# Patient Record
Sex: FEMALE | Race: WHITE | NOT HISPANIC OR LATINO
[De-identification: names, ages, dates, MRNs, and addresses within clinical notes are randomized per-mention and may not be internally consistent; named-entity substitution may affect disease eponyms.]

---

## 2019-03-05 ENCOUNTER — APPOINTMENT (OUTPATIENT)
Dept: INTERNAL MEDICINE | Facility: CLINIC | Age: 49
End: 2019-03-05
Payer: COMMERCIAL

## 2019-03-05 ENCOUNTER — NON-APPOINTMENT (OUTPATIENT)
Age: 49
End: 2019-03-05

## 2019-03-05 VITALS
BODY MASS INDEX: 25.4 KG/M2 | RESPIRATION RATE: 18 BRPM | SYSTOLIC BLOOD PRESSURE: 122 MMHG | DIASTOLIC BLOOD PRESSURE: 82 MMHG | HEIGHT: 62 IN | HEART RATE: 73 BPM | TEMPERATURE: 98.3 F | OXYGEN SATURATION: 97 % | WEIGHT: 138 LBS

## 2019-03-05 DIAGNOSIS — Z82.49 FAMILY HISTORY OF ISCHEMIC HEART DISEASE AND OTHER DISEASES OF THE CIRCULATORY SYSTEM: ICD-10-CM

## 2019-03-05 DIAGNOSIS — Z80.9 FAMILY HISTORY OF MALIGNANT NEOPLASM, UNSPECIFIED: ICD-10-CM

## 2019-03-05 DIAGNOSIS — Z78.9 OTHER SPECIFIED HEALTH STATUS: ICD-10-CM

## 2019-03-05 DIAGNOSIS — Z84.2 FAMILY HISTORY OF OTHER DISEASES OF THE GENITOURINARY SYSTEM: ICD-10-CM

## 2019-03-05 DIAGNOSIS — Z83.79 FAMILY HISTORY OF OTHER DISEASES OF THE DIGESTIVE SYSTEM: ICD-10-CM

## 2019-03-05 DIAGNOSIS — Z82.5 FAMILY HISTORY OF ASTHMA AND OTHER CHRONIC LOWER RESPIRATORY DISEASES: ICD-10-CM

## 2019-03-05 DIAGNOSIS — Z82.69 FAMILY HISTORY OF OTHER DISEASES OF THE MUSCULOSKELETAL SYSTEM AND CONNECTIVE TISSUE: ICD-10-CM

## 2019-03-05 DIAGNOSIS — Z83.2 FAMILY HISTORY OF DISEASES OF THE BLOOD AND BLOOD-FORMING ORGANS AND CERTAIN DISORDERS INVOLVING THE IMMUNE MECHANISM: ICD-10-CM

## 2019-03-05 PROCEDURE — 99205 OFFICE O/P NEW HI 60 MIN: CPT | Mod: 25

## 2019-03-05 PROCEDURE — 93000 ELECTROCARDIOGRAM COMPLETE: CPT

## 2019-03-05 PROCEDURE — ZZZZZ: CPT

## 2019-03-05 PROCEDURE — 94729 DIFFUSING CAPACITY: CPT

## 2019-03-05 PROCEDURE — 94060 EVALUATION OF WHEEZING: CPT

## 2019-03-05 PROCEDURE — 94727 GAS DIL/WSHOT DETER LNG VOL: CPT

## 2019-03-05 NOTE — PLAN
[FreeTextEntry1] : Ms. Nash presents for initial evaluation for primary care. She has a history of mild asthma and will continue on Singulair 10 mg daily and albuterol p.r.n. She is complaining of episodes of heartburn and chest discomfort at night. Patient will be started on Protonix 40 mg daily. She will followup in 6 weeks' time. Prescription for comprehensive blood profile has been given. Patient has also been referred to cardiology for her evaluation of palpitations. She will most likely require an echocardiogram. I've advised her to minimize caffeine intake. Followup is scheduled

## 2019-03-05 NOTE — DATA REVIEWED
[FreeTextEntry1] : Complete pulmonary function tests were normal spirometry, lung volumes and flow volume loop. Diffusing capacity is normal.

## 2019-03-05 NOTE — HEALTH RISK ASSESSMENT
[Excellent] : ~his/her~  mood as  excellent [16-20] : 16-20 [No falls in past year] : Patient reported no falls in the past year [0] : 2) Feeling down, depressed, or hopeless: Not at all (0) [Fully functional (bathing, dressing, toileting, transferring, walking, feeding)] : Fully functional (bathing, dressing, toileting, transferring, walking, feeding) [Fully functional (using the telephone, shopping, preparing meals, housekeeping, doing laundry, using] : Fully functional and needs no help or supervision to perform IADLs (using the telephone, shopping, preparing meals, housekeeping, doing laundry, using transportation, managing medications and managing finances) [Aggressive treatment] : aggressive treatment [] : No [Change in mental status noted] : No change in mental status noted [AdvancecareDate] : 3/19

## 2019-03-05 NOTE — HISTORY OF PRESENT ILLNESS
[FreeTextEntry1] : initial physical examination [de-identified] : Ms. Nash presents for initial physical examination. She has a history of mild asthma. She is currently controlled on Singulair 10 mg daily and has albuterol metered-dose inhaler which he uses on a p.r.n. basis. The patient fell several months without requiring albuterol. Ms. Nash is complaining of some symptoms of fatigue mostly at night. This is associated with some palpitations. Patient states she K. she wakes up in the morning with a headache. There is no history of snoring or witnessed apneas. Her  does have obstructive sleep apnea and wears a CPAP device. Ms. Nash also is complaining of intermittent episodes of chest discomfort at night. She does have symptoms of heartburn during the day and evening time and takes TUMS on an intermittent basis. The TUMS to provide relief. She denies any shortness of breath. Ms. Nash is a nonsmoker. She stopped smoking in 2009 and has a 20-pack-year history. She works as a .

## 2019-03-06 ENCOUNTER — TRANSCRIPTION ENCOUNTER (OUTPATIENT)
Age: 49
End: 2019-03-06

## 2019-04-23 ENCOUNTER — APPOINTMENT (OUTPATIENT)
Dept: INTERNAL MEDICINE | Facility: CLINIC | Age: 49
End: 2019-04-23

## 2019-05-03 ENCOUNTER — APPOINTMENT (OUTPATIENT)
Dept: CARDIOLOGY | Facility: CLINIC | Age: 49
End: 2019-05-03
Payer: COMMERCIAL

## 2019-05-03 ENCOUNTER — NON-APPOINTMENT (OUTPATIENT)
Age: 49
End: 2019-05-03

## 2019-05-03 VITALS
OXYGEN SATURATION: 99 % | HEART RATE: 72 BPM | SYSTOLIC BLOOD PRESSURE: 96 MMHG | WEIGHT: 144 LBS | BODY MASS INDEX: 26.34 KG/M2 | DIASTOLIC BLOOD PRESSURE: 62 MMHG

## 2019-05-03 PROCEDURE — 99204 OFFICE O/P NEW MOD 45 MIN: CPT | Mod: 25

## 2019-05-03 PROCEDURE — 93000 ELECTROCARDIOGRAM COMPLETE: CPT

## 2019-05-03 RX ORDER — MONTELUKAST 10 MG/1
10 TABLET, FILM COATED ORAL
Refills: 0 | Status: DISCONTINUED | COMMUNITY
End: 2019-05-03

## 2019-05-03 NOTE — PHYSICAL EXAM
[General Appearance - Well Developed] : well developed [Well Groomed] : well groomed [Normal Appearance] : normal appearance [No Deformities] : no deformities [General Appearance - Well Nourished] : well nourished [Normal Conjunctiva] : the conjunctiva exhibited no abnormalities [General Appearance - In No Acute Distress] : no acute distress [Normal Oral Mucosa] : normal oral mucosa [Eyelids - No Xanthelasma] : the eyelids demonstrated no xanthelasmas [Normal Jugular Venous A Waves Present] : normal jugular venous A waves present [No Oral Cyanosis] : no oral cyanosis [No Oral Pallor] : no oral pallor [No Jugular Venous Burton A Waves] : no jugular venous burton A waves [Normal Jugular Venous V Waves Present] : normal jugular venous V waves present [Heart Rate And Rhythm] : heart rate and rhythm were normal [Heart Sounds] : normal S1 and S2 [Murmurs] : no murmurs present [Respiration, Rhythm And Depth] : normal respiratory rhythm and effort [Exaggerated Use Of Accessory Muscles For Inspiration] : no accessory muscle use [Abdomen Soft] : soft [Auscultation Breath Sounds / Voice Sounds] : lungs were clear to auscultation bilaterally [Abdomen Tenderness] : non-tender [Abdomen Mass (___ Cm)] : no abdominal mass palpated [Abnormal Walk] : normal gait [Gait - Sufficient For Exercise Testing] : the gait was sufficient for exercise testing [Cyanosis, Localized] : no localized cyanosis [Nail Clubbing] : no clubbing of the fingernails [Petechial Hemorrhages (___cm)] : no petechial hemorrhages [] : no rash [Skin Color & Pigmentation] : normal skin color and pigmentation [No Venous Stasis] : no venous stasis [No Skin Ulcers] : no skin ulcer [Skin Lesions] : no skin lesions [No Xanthoma] : no  xanthoma was observed [Oriented To Time, Place, And Person] : oriented to person, place, and time [Affect] : the affect was normal [Mood] : the mood was normal [No Anxiety] : not feeling anxious

## 2019-05-03 NOTE — HISTORY OF PRESENT ILLNESS
[FreeTextEntry1] : 50 yo female presents for cardiac evaluation. Pt reports chest discomfort at rest and with exertion. Pt was recently treated with PPI for symptoms of Gerd with a good result. Pt reports that chest discomfort is pressure like and is relieved by folding her hand over her chest. Pain is not affected by breathing or palpation.

## 2019-05-03 NOTE — DISCUSSION/SUMMARY
[Possible Cardiac Ischemia (Intermd Prob)] : possible cardiac ischemia (intermediate probability) [Paroxysmal SVT] : paroxysmal supraventricular tachycardia [Patient] : the patient [FreeTextEntry1] : Pt will continue to suspend singulair therapy. She will take a multivitamin, and Mg supplementation. Echo and ETT. F/U in 1 mo

## 2019-05-20 ENCOUNTER — APPOINTMENT (OUTPATIENT)
Dept: CARDIOLOGY | Facility: CLINIC | Age: 49
End: 2019-05-20
Payer: COMMERCIAL

## 2019-05-20 PROCEDURE — 93306 TTE W/DOPPLER COMPLETE: CPT

## 2019-05-24 ENCOUNTER — APPOINTMENT (OUTPATIENT)
Dept: CARDIOLOGY | Facility: CLINIC | Age: 49
End: 2019-05-24
Payer: COMMERCIAL

## 2019-05-24 PROCEDURE — 93015 CV STRESS TEST SUPVJ I&R: CPT

## 2019-05-31 ENCOUNTER — APPOINTMENT (OUTPATIENT)
Dept: CARDIOLOGY | Facility: CLINIC | Age: 49
End: 2019-05-31
Payer: COMMERCIAL

## 2019-05-31 ENCOUNTER — NON-APPOINTMENT (OUTPATIENT)
Age: 49
End: 2019-05-31

## 2019-05-31 VITALS
WEIGHT: 141 LBS | HEIGHT: 62 IN | HEART RATE: 84 BPM | OXYGEN SATURATION: 100 % | BODY MASS INDEX: 25.95 KG/M2 | SYSTOLIC BLOOD PRESSURE: 107 MMHG | DIASTOLIC BLOOD PRESSURE: 69 MMHG

## 2019-05-31 PROCEDURE — 93000 ELECTROCARDIOGRAM COMPLETE: CPT

## 2019-05-31 PROCEDURE — 99214 OFFICE O/P EST MOD 30 MIN: CPT | Mod: 25

## 2019-06-01 NOTE — DISCUSSION/SUMMARY
[Possible Cardiac Ischemia (Intermd Prob)] : possible cardiac ischemia (intermediate probability) [Paroxysmal SVT] : paroxysmal supraventricular tachycardia [Patient] : the patient [FreeTextEntry1] : Pt will continue to exercise and eat a heart healthy diet. She will avoid medications which may exacerbate palpitations.

## 2019-06-01 NOTE — HISTORY OF PRESENT ILLNESS
[FreeTextEntry1] : 48 yo female presents for follow up for testing results. Her previously described palpitations have been remediated by cessation of Singulair and her chest discomfort has been improved with PPI therapy. Her ETT and Echo were performed and were unremarkable.

## 2019-06-01 NOTE — PHYSICAL EXAM
[General Appearance - Well Developed] : well developed [Well Groomed] : well groomed [Normal Appearance] : normal appearance [No Deformities] : no deformities [General Appearance - Well Nourished] : well nourished [General Appearance - In No Acute Distress] : no acute distress [Normal Conjunctiva] : the conjunctiva exhibited no abnormalities [Eyelids - No Xanthelasma] : the eyelids demonstrated no xanthelasmas [No Oral Pallor] : no oral pallor [Normal Oral Mucosa] : normal oral mucosa [Normal Jugular Venous A Waves Present] : normal jugular venous A waves present [No Oral Cyanosis] : no oral cyanosis [Normal Jugular Venous V Waves Present] : normal jugular venous V waves present [No Jugular Venous Burton A Waves] : no jugular venous burton A waves [Respiration, Rhythm And Depth] : normal respiratory rhythm and effort [Exaggerated Use Of Accessory Muscles For Inspiration] : no accessory muscle use [Auscultation Breath Sounds / Voice Sounds] : lungs were clear to auscultation bilaterally [Heart Sounds] : normal S1 and S2 [Heart Rate And Rhythm] : heart rate and rhythm were normal [Abdomen Soft] : soft [Murmurs] : no murmurs present [Abdomen Tenderness] : non-tender [Abdomen Mass (___ Cm)] : no abdominal mass palpated [Abnormal Walk] : normal gait [Gait - Sufficient For Exercise Testing] : the gait was sufficient for exercise testing [Cyanosis, Localized] : no localized cyanosis [Petechial Hemorrhages (___cm)] : no petechial hemorrhages [Nail Clubbing] : no clubbing of the fingernails [Skin Color & Pigmentation] : normal skin color and pigmentation [] : no rash [No Venous Stasis] : no venous stasis [Skin Lesions] : no skin lesions [No Xanthoma] : no  xanthoma was observed [No Skin Ulcers] : no skin ulcer [Oriented To Time, Place, And Person] : oriented to person, place, and time [Affect] : the affect was normal [No Anxiety] : not feeling anxious [Mood] : the mood was normal

## 2019-08-26 ENCOUNTER — RX RENEWAL (OUTPATIENT)
Age: 49
End: 2019-08-26

## 2020-01-08 ENCOUNTER — APPOINTMENT (OUTPATIENT)
Dept: INTERNAL MEDICINE | Facility: CLINIC | Age: 50
End: 2020-01-08
Payer: COMMERCIAL

## 2020-01-08 ENCOUNTER — NON-APPOINTMENT (OUTPATIENT)
Age: 50
End: 2020-01-08

## 2020-01-08 VITALS
WEIGHT: 141 LBS | RESPIRATION RATE: 20 BRPM | HEART RATE: 100 BPM | DIASTOLIC BLOOD PRESSURE: 88 MMHG | BODY MASS INDEX: 25.95 KG/M2 | OXYGEN SATURATION: 98 % | TEMPERATURE: 99.1 F | HEIGHT: 62 IN | SYSTOLIC BLOOD PRESSURE: 142 MMHG

## 2020-01-08 PROCEDURE — 99214 OFFICE O/P EST MOD 30 MIN: CPT | Mod: 25

## 2020-01-08 PROCEDURE — 94060 EVALUATION OF WHEEZING: CPT

## 2020-01-08 NOTE — PHYSICAL EXAM
[No Acute Distress] : no acute distress [Well Nourished] : well nourished [Well Developed] : well developed [Well-Appearing] : well-appearing [Normal Sclera/Conjunctiva] : normal sclera/conjunctiva [PERRL] : pupils equal round and reactive to light [Normal Outer Ear/Nose] : the outer ears and nose were normal in appearance [EOMI] : extraocular movements intact [No Lymphadenopathy] : no lymphadenopathy [No JVD] : no jugular venous distention [Normal Oropharynx] : the oropharynx was normal [Supple] : supple [Thyroid Normal, No Nodules] : the thyroid was normal and there were no nodules present [No Accessory Muscle Use] : no accessory muscle use [Clear to Auscultation] : lungs were clear to auscultation bilaterally [No Respiratory Distress] : no respiratory distress  [Normal Rate] : normal rate  [Normal S1, S2] : normal S1 and S2 [Regular Rhythm] : with a regular rhythm [No Murmur] : no murmur heard [No Carotid Bruits] : no carotid bruits [No Varicosities] : no varicosities [No Abdominal Bruit] : a ~M bruit was not heard ~T in the abdomen [No Edema] : there was no peripheral edema [Pedal Pulses Present] : the pedal pulses are present [No Extremity Clubbing/Cyanosis] : no extremity clubbing/cyanosis [Soft] : abdomen soft [No Palpable Aorta] : no palpable aorta [Non Tender] : non-tender [Non-distended] : non-distended [No HSM] : no HSM [No Masses] : no abdominal mass palpated [Normal Bowel Sounds] : normal bowel sounds [No CVA Tenderness] : no CVA  tenderness [Normal Posterior Cervical Nodes] : no posterior cervical lymphadenopathy [Normal Anterior Cervical Nodes] : no anterior cervical lymphadenopathy [No Spinal Tenderness] : no spinal tenderness [No Joint Swelling] : no joint swelling [Grossly Normal Strength/Tone] : grossly normal strength/tone [No Rash] : no rash [Coordination Grossly Intact] : coordination grossly intact [No Focal Deficits] : no focal deficits [Normal Gait] : normal gait [Deep Tendon Reflexes (DTR)] : deep tendon reflexes were 2+ and symmetric [Normal Affect] : the affect was normal [Normal Insight/Judgement] : insight and judgment were intact

## 2020-01-09 ENCOUNTER — OUTPATIENT (OUTPATIENT)
Dept: OUTPATIENT SERVICES | Facility: HOSPITAL | Age: 50
LOS: 1 days | End: 2020-01-09
Payer: COMMERCIAL

## 2020-01-09 DIAGNOSIS — R09.81 NASAL CONGESTION: ICD-10-CM

## 2020-01-09 PROCEDURE — 71046 X-RAY EXAM CHEST 2 VIEWS: CPT | Mod: 26

## 2020-01-10 ENCOUNTER — CHART COPY (OUTPATIENT)
Age: 50
End: 2020-01-10

## 2020-01-10 NOTE — HISTORY OF PRESENT ILLNESS
[FreeTextEntry1] : acute evaluation [de-identified] : Ms. Nash presents for follow up evaluation. She is complaining of sinus congestion and persistent cough for the past 6 weeks. The cough keeps her up at night. It is occasionally productive of clear phlegm. She did have a course of Augmentin with no significant improvement. Ms. Nash is complaining of sinus congestion and postnasal drip.

## 2020-01-10 NOTE — PLAN
[FreeTextEntry1] : Spirometry pre-and post bronchodilator therapy shows normal flow rates  presents for acute evaluation. She will be sent for a chest x-ray, PA and lateral due to her persistent cough. Patient given prednisone 20 mg p.o. b.i.d. x7 days. She does not require oral antibiotics. Patient has been given a prescription comprehensive blood profile. She will followup in 3 months for complete physical examination.

## 2020-04-08 ENCOUNTER — APPOINTMENT (OUTPATIENT)
Dept: INTERNAL MEDICINE | Facility: CLINIC | Age: 50
End: 2020-04-08

## 2020-06-08 ENCOUNTER — APPOINTMENT (OUTPATIENT)
Dept: INTERNAL MEDICINE | Facility: CLINIC | Age: 50
End: 2020-06-08

## 2020-09-24 ENCOUNTER — APPOINTMENT (OUTPATIENT)
Dept: INTERNAL MEDICINE | Facility: CLINIC | Age: 50
End: 2020-09-24
Payer: COMMERCIAL

## 2020-09-24 VITALS
RESPIRATION RATE: 16 BRPM | HEIGHT: 62 IN | TEMPERATURE: 98.2 F | OXYGEN SATURATION: 98 % | DIASTOLIC BLOOD PRESSURE: 76 MMHG | HEART RATE: 72 BPM | WEIGHT: 140 LBS | BODY MASS INDEX: 25.76 KG/M2 | SYSTOLIC BLOOD PRESSURE: 124 MMHG

## 2020-09-24 DIAGNOSIS — Z00.00 ENCOUNTER FOR GENERAL ADULT MEDICAL EXAMINATION W/OUT ABNORMAL FINDINGS: ICD-10-CM

## 2020-09-24 PROCEDURE — 99213 OFFICE O/P EST LOW 20 MIN: CPT

## 2020-09-24 RX ORDER — HYDROCODONE BITARTRATE AND HOMATROPINE METHYLBROMIDE 5; 1.5 MG/5ML; MG/5ML
5-1.5 SYRUP ORAL
Qty: 240 | Refills: 0 | Status: DISCONTINUED | COMMUNITY
Start: 2020-01-08 | End: 2020-09-24

## 2020-09-24 RX ORDER — UBIDECARENONE 200 MG
CAPSULE ORAL
Refills: 0 | Status: DISCONTINUED | COMMUNITY
End: 2020-09-24

## 2020-09-24 RX ORDER — BROMPHENIRAMINE MALEATE, PSEUDOEPHEDRINE HYDROCHLORIDE, 2; 30; 10 MG/5ML; MG/5ML; MG/5ML
30-2-10 SYRUP ORAL
Qty: 200 | Refills: 0 | Status: DISCONTINUED | COMMUNITY
Start: 2019-08-15 | End: 2020-09-24

## 2020-09-24 RX ORDER — ALBUTEROL SULFATE 90 UG/1
108 (90 BASE) INHALANT RESPIRATORY (INHALATION)
Qty: 18 | Refills: 0 | Status: DISCONTINUED | COMMUNITY
Start: 2019-08-17 | End: 2020-09-24

## 2020-09-24 RX ORDER — AMOXICILLIN 875 MG/1
875 TABLET, FILM COATED ORAL
Qty: 20 | Refills: 0 | Status: DISCONTINUED | COMMUNITY
Start: 2019-08-15 | End: 2020-09-24

## 2020-09-24 RX ORDER — PNV NO.95/FERROUS FUM/FOLIC AC 28MG-0.8MG
TABLET ORAL
Refills: 0 | Status: DISCONTINUED | COMMUNITY
End: 2020-09-24

## 2020-09-24 RX ORDER — IPRATROPIUM BROMIDE 42 UG/1
0.06 SPRAY NASAL
Qty: 15 | Refills: 0 | Status: DISCONTINUED | COMMUNITY
Start: 2019-08-17 | End: 2020-09-24

## 2020-09-24 RX ORDER — PREDNISONE 10 MG/1
10 TABLET ORAL
Qty: 24 | Refills: 0 | Status: DISCONTINUED | COMMUNITY
Start: 2019-08-17 | End: 2020-09-24

## 2020-09-24 RX ORDER — PREDNISONE 20 MG/1
20 TABLET ORAL TWICE DAILY
Qty: 14 | Refills: 1 | Status: DISCONTINUED | COMMUNITY
Start: 2020-01-08 | End: 2020-09-24

## 2020-09-24 RX ORDER — PANTOPRAZOLE 40 MG/1
40 TABLET, DELAYED RELEASE ORAL
Qty: 30 | Refills: 3 | Status: DISCONTINUED | COMMUNITY
Start: 2019-03-05 | End: 2020-09-24

## 2020-09-24 RX ORDER — NORETHINDRONE ACETATE AND ETHINYL ESTRADIOL AND FERROUS FUMARATE 1MG-20(21)
1-20 KIT ORAL
Qty: 28 | Refills: 0 | Status: DISCONTINUED | COMMUNITY
Start: 2019-02-25 | End: 2020-09-24

## 2020-09-24 RX ORDER — MOMETASONE 50 UG/1
50 SPRAY, METERED NASAL
Qty: 1 | Refills: 5 | Status: DISCONTINUED | COMMUNITY
Start: 2020-01-08 | End: 2020-09-24

## 2020-09-24 NOTE — ASSESSMENT
[FreeTextEntry1] : GERD triggers reviewed. Avoid caffeine, alcohol, peppermint, chocolate, acidic and spicy foods, late night eating.\par Take PPI 30 minutes before breakfast.  \par Call if symptoms not improving 2 weeks.\par \par Schedule CPE.  Do FBW prior to visit.

## 2020-09-24 NOTE — HISTORY OF PRESENT ILLNESS
[FreeTextEntry8] : sore throat\par \par Presents today with throat pain for several weeks.  Throat feels very dry.  She has some pain with swallowing. There are no fevers or chills.  She does have some seasonal allergies but no significant PND. nasal congestion or cough.   No GERD symptoms. At times feels lump sensation. Last year she was placed on Pantoprazole for silent GERD and states her asthma symptoms went away.   Stopped meds months ago.  \par Smokes rare cigarette. \par She did have COVID in March and was able to treat herself at home with symptomatic treatment.  Had AB testing done and was +

## 2020-10-13 ENCOUNTER — APPOINTMENT (OUTPATIENT)
Dept: INTERNAL MEDICINE | Facility: CLINIC | Age: 50
End: 2020-10-13
Payer: COMMERCIAL

## 2020-10-13 VITALS
HEART RATE: 75 BPM | TEMPERATURE: 98 F | DIASTOLIC BLOOD PRESSURE: 84 MMHG | WEIGHT: 147 LBS | SYSTOLIC BLOOD PRESSURE: 140 MMHG | OXYGEN SATURATION: 97 % | RESPIRATION RATE: 18 BRPM | HEIGHT: 62 IN | BODY MASS INDEX: 27.05 KG/M2

## 2020-10-13 DIAGNOSIS — Z87.898 PERSONAL HISTORY OF OTHER SPECIFIED CONDITIONS: ICD-10-CM

## 2020-10-13 DIAGNOSIS — Z86.19 PERSONAL HISTORY OF OTHER INFECTIOUS AND PARASITIC DISEASES: ICD-10-CM

## 2020-10-13 PROCEDURE — 99396 PREV VISIT EST AGE 40-64: CPT

## 2020-10-13 NOTE — HEALTH RISK ASSESSMENT
[Yes] : Yes [Employed] : employed [Smoke Detector] : smoke detector [Carbon Monoxide Detector] : carbon monoxide detector [Guns at Home] : guns at home [Safety elements used in home] : safety elements used in home [Seat Belt] :  uses seat belt [Sunscreen] : uses sunscreen [Very Good] : ~his/her~ current health as very good [No] : In the past 12 months have you used drugs other than those required for medical reasons? 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

## 2020-10-13 NOTE — HISTORY OF PRESENT ILLNESS
[FreeTextEntry1] : followup [de-identified] : Ms. Nash presents for an annual physical examination. She is currently feeling well. The patient was diagnosed with the corona virus infection in March. At that time she had bodyaches and fever. Currently, she has no chest pain, shortness of breath or palpitations. The patient denies any hematuria or dysuria. She does have a some symptoms of gastroesophageal reflux disease. She was seen by nurse practitioner Gayatri Robertson several weeks ago and started on pantoprazole. her throat pain and feeling of lump in her throat have resolved.

## 2020-10-13 NOTE — PLAN
[FreeTextEntry1] : Ms. Nash presents for an annual physical examination. She has a history of corona virus infection. She will continue on pantoprazole for symptoms of gastroesophageal reflux disease. She will discontinue medication after 6-8 weeks. She has return of symptoms she will follow up with gastroenterology for possible endoscopy. She has been referred for screening colonoscopy. Patient will go for comprehensive blood profile. Followup in one year.

## 2020-12-20 ENCOUNTER — RX RENEWAL (OUTPATIENT)
Age: 50
End: 2020-12-20

## 2021-01-11 LAB — HBA1C MFR BLD HPLC: 5.2

## 2021-02-08 ENCOUNTER — APPOINTMENT (OUTPATIENT)
Dept: INTERNAL MEDICINE | Facility: CLINIC | Age: 51
End: 2021-02-08
Payer: COMMERCIAL

## 2021-02-08 DIAGNOSIS — R79.89 OTHER SPECIFIED ABNORMAL FINDINGS OF BLOOD CHEMISTRY: ICD-10-CM

## 2021-02-08 PROCEDURE — 99442: CPT

## 2021-02-08 RX ORDER — PANTOPRAZOLE 40 MG/1
40 TABLET, DELAYED RELEASE ORAL DAILY
Qty: 30 | Refills: 1 | Status: DISCONTINUED | COMMUNITY
Start: 2020-09-24 | End: 2021-02-08

## 2021-02-08 NOTE — HISTORY OF PRESENT ILLNESS
[Other Location: e.g. School (Enter Location, City,State)___] : at [unfilled], at the time of the visit. [Medical Office: ([facility])___] : at the medical office located in  [Verbal consent obtained from patient] : the patient, [unfilled] [FreeTextEntry8] : UTI/review recent BW\par Having frequency and dysuria for several days without fevers. Some lower abd pressure.  Treated with NItrofuantoin x 5 days 3 weeks ago for UTI by GYN with improvement in symptoms at that time.  Recent UA + WBCs, 3+luek esterace.  Culture is pending.  She has initial appt with Dr. Graham 2/12.  Will tx empirically with Cipro 500 mg bid x 7 days. Await UC\par Additionally LFTS were repeated due to elevations of AST/ALT.  These remain elevated.  Hep screen neg.  Will obtain abdominal sonogram and advised to see GI /Ashley.    Colonoscopy also due.  .

## 2021-02-11 ENCOUNTER — NON-APPOINTMENT (OUTPATIENT)
Age: 51
End: 2021-02-11

## 2021-02-13 ENCOUNTER — APPOINTMENT (OUTPATIENT)
Dept: ULTRASOUND IMAGING | Facility: CLINIC | Age: 51
End: 2021-02-13
Payer: COMMERCIAL

## 2021-02-13 ENCOUNTER — OUTPATIENT (OUTPATIENT)
Dept: OUTPATIENT SERVICES | Facility: HOSPITAL | Age: 51
LOS: 1 days | End: 2021-02-13
Payer: COMMERCIAL

## 2021-02-13 DIAGNOSIS — R79.89 OTHER SPECIFIED ABNORMAL FINDINGS OF BLOOD CHEMISTRY: ICD-10-CM

## 2021-02-13 PROCEDURE — 76700 US EXAM ABDOM COMPLETE: CPT

## 2021-02-13 PROCEDURE — 76700 US EXAM ABDOM COMPLETE: CPT | Mod: 26

## 2021-02-17 ENCOUNTER — NON-APPOINTMENT (OUTPATIENT)
Age: 51
End: 2021-02-17

## 2021-02-18 ENCOUNTER — NON-APPOINTMENT (OUTPATIENT)
Age: 51
End: 2021-02-18

## 2021-03-23 ENCOUNTER — APPOINTMENT (OUTPATIENT)
Dept: GASTROENTEROLOGY | Facility: CLINIC | Age: 51
End: 2021-03-23
Payer: COMMERCIAL

## 2021-03-23 VITALS
BODY MASS INDEX: 27.42 KG/M2 | HEART RATE: 89 BPM | HEIGHT: 62 IN | WEIGHT: 149 LBS | SYSTOLIC BLOOD PRESSURE: 150 MMHG | DIASTOLIC BLOOD PRESSURE: 78 MMHG

## 2021-03-23 DIAGNOSIS — Z12.11 ENCOUNTER FOR SCREENING FOR MALIGNANT NEOPLASM OF COLON: ICD-10-CM

## 2021-03-23 PROCEDURE — 99072 ADDL SUPL MATRL&STAF TM PHE: CPT

## 2021-03-23 PROCEDURE — 99203 OFFICE O/P NEW LOW 30 MIN: CPT

## 2021-03-23 NOTE — HISTORY OF PRESENT ILLNESS
[de-identified] : Ms. MARIYA VALDIVIA is a 51 year old female with history of GERD symptoms requiring pantoprazole. Patient has had asthma which has responded to the use of PPIs eliminating were as medication. Patient does note occasional dysphagia which responds to drinking liquids. There has been no weight loss. There has been no bowel changes. Patient never had prior screening.\par

## 2021-03-23 NOTE — ASSESSMENT
[FreeTextEntry1] : 50 yo female screening colonoscopy with history of GERD symptoms and history of dysphagia. Will arrange for colonoscopy with Suprep and upper endoscopy.

## 2021-04-24 ENCOUNTER — APPOINTMENT (OUTPATIENT)
Dept: DISASTER EMERGENCY | Facility: CLINIC | Age: 51
End: 2021-04-24

## 2021-04-25 LAB — SARS-COV-2 N GENE NPH QL NAA+PROBE: NOT DETECTED

## 2021-04-27 ENCOUNTER — RESULT REVIEW (OUTPATIENT)
Age: 51
End: 2021-04-27

## 2021-04-27 ENCOUNTER — APPOINTMENT (OUTPATIENT)
Dept: GASTROENTEROLOGY | Facility: AMBULATORY MEDICAL SERVICES | Age: 51
End: 2021-04-27
Payer: COMMERCIAL

## 2021-04-27 PROCEDURE — 45378 DIAGNOSTIC COLONOSCOPY: CPT

## 2021-04-27 PROCEDURE — 43239 EGD BIOPSY SINGLE/MULTIPLE: CPT

## 2021-06-30 ENCOUNTER — OUTPATIENT (OUTPATIENT)
Dept: OUTPATIENT SERVICES | Facility: HOSPITAL | Age: 51
LOS: 1 days | End: 2021-06-30
Payer: COMMERCIAL

## 2021-06-30 ENCOUNTER — APPOINTMENT (OUTPATIENT)
Dept: INTERNAL MEDICINE | Facility: CLINIC | Age: 51
End: 2021-06-30
Payer: COMMERCIAL

## 2021-06-30 ENCOUNTER — APPOINTMENT (OUTPATIENT)
Dept: RADIOLOGY | Facility: CLINIC | Age: 51
End: 2021-06-30
Payer: COMMERCIAL

## 2021-06-30 VITALS
WEIGHT: 142 LBS | TEMPERATURE: 98.3 F | OXYGEN SATURATION: 97 % | DIASTOLIC BLOOD PRESSURE: 82 MMHG | RESPIRATION RATE: 16 BRPM | SYSTOLIC BLOOD PRESSURE: 130 MMHG | HEART RATE: 86 BPM | BODY MASS INDEX: 26.13 KG/M2 | HEIGHT: 62 IN

## 2021-06-30 DIAGNOSIS — R05 COUGH: ICD-10-CM

## 2021-06-30 PROCEDURE — 71046 X-RAY EXAM CHEST 2 VIEWS: CPT | Mod: 26

## 2021-06-30 PROCEDURE — 71046 X-RAY EXAM CHEST 2 VIEWS: CPT

## 2021-06-30 PROCEDURE — 99214 OFFICE O/P EST MOD 30 MIN: CPT | Mod: 25

## 2021-06-30 RX ORDER — SODIUM SULFATE, POTASSIUM SULFATE, MAGNESIUM SULFATE 17.5; 3.13; 1.6 G/ML; G/ML; G/ML
17.5-3.13-1.6 SOLUTION, CONCENTRATE ORAL
Qty: 1 | Refills: 0 | Status: DISCONTINUED | COMMUNITY
Start: 2021-03-23 | End: 2021-06-30

## 2021-06-30 RX ORDER — CIPROFLOXACIN HYDROCHLORIDE 500 MG/1
500 TABLET, FILM COATED ORAL TWICE DAILY
Qty: 14 | Refills: 0 | Status: DISCONTINUED | COMMUNITY
Start: 2021-02-08 | End: 2021-06-30

## 2021-06-30 NOTE — PLAN
[FreeTextEntry1] : \par \par Do CXR today\par Check LFTs,bmp \par Start Medrol dose pack with food\par \par All questions were answered.  \par Reviewed symptoms to merit emergent  ED evaluation.\par Call with any concerns.\par \par Case and plan reviewed with Dr. Jackson.  \par

## 2021-06-30 NOTE — PHYSICAL EXAM
[Normal] : normal rate, regular rhythm, normal S1 and S2 and no murmur heard [No Edema] : there was no peripheral edema [No Extremity Clubbing/Cyanosis] : no extremity clubbing/cyanosis [Soft] : abdomen soft [Non Tender] : non-tender [Normal Bowel Sounds] : normal bowel sounds [de-identified] : TTP R lower chest wall

## 2021-06-30 NOTE — HISTORY OF PRESENT ILLNESS
[FreeTextEntry8] : cough\par Has a mild cough for the past few weeks.  Cough slightly productive of clear sputum.  At times has  tight sensation R lower chest with deep inspiration.    NO fevers, wheeze.   No hemoptysis.  Similar pain when she had pneumonia many years ago.    Went to  6/23.   Given zpack and cough med which she stopped after 4 days as she was receiving her first COVID vaccine 6/27/21.  Able to exercise for 20 minutes on ellipical. \par H/O elevated LFTs with normal abdominal sono.  Hep screen, mono neg.  Saw GI. Had colon/egd.

## 2021-07-01 ENCOUNTER — NON-APPOINTMENT (OUTPATIENT)
Age: 51
End: 2021-07-01

## 2021-07-01 ENCOUNTER — OUTPATIENT (OUTPATIENT)
Dept: OUTPATIENT SERVICES | Facility: HOSPITAL | Age: 51
LOS: 1 days | End: 2021-07-01
Payer: COMMERCIAL

## 2021-07-01 ENCOUNTER — APPOINTMENT (OUTPATIENT)
Dept: CT IMAGING | Facility: CLINIC | Age: 51
End: 2021-07-01
Payer: COMMERCIAL

## 2021-07-01 DIAGNOSIS — R07.9 CHEST PAIN, UNSPECIFIED: ICD-10-CM

## 2021-07-01 LAB
ALBUMIN SERPL ELPH-MCNC: 4.7 G/DL
ALP BLD-CCNC: 49 U/L
ALT SERPL-CCNC: 41 U/L
ANION GAP SERPL CALC-SCNC: 16 MMOL/L
AST SERPL-CCNC: 39 U/L
BILIRUB DIRECT SERPL-MCNC: 0.1 MG/DL
BILIRUB INDIRECT SERPL-MCNC: 0.1 MG/DL
BILIRUB SERPL-MCNC: 0.2 MG/DL
BUN SERPL-MCNC: 13 MG/DL
CALCIUM SERPL-MCNC: 10.4 MG/DL
CHLORIDE SERPL-SCNC: 101 MMOL/L
CO2 SERPL-SCNC: 23 MMOL/L
CREAT SERPL-MCNC: 0.82 MG/DL
GLUCOSE SERPL-MCNC: 91 MG/DL
POTASSIUM SERPL-SCNC: 4.3 MMOL/L
PROT SERPL-MCNC: 7.5 G/DL
SODIUM SERPL-SCNC: 140 MMOL/L

## 2021-07-01 PROCEDURE — 71275 CT ANGIOGRAPHY CHEST: CPT | Mod: 26

## 2021-07-01 PROCEDURE — 71275 CT ANGIOGRAPHY CHEST: CPT

## 2021-11-10 ENCOUNTER — APPOINTMENT (OUTPATIENT)
Dept: INTERNAL MEDICINE | Facility: CLINIC | Age: 51
End: 2021-11-10
Payer: COMMERCIAL

## 2021-11-10 ENCOUNTER — NON-APPOINTMENT (OUTPATIENT)
Age: 51
End: 2021-11-10

## 2021-11-10 VITALS
DIASTOLIC BLOOD PRESSURE: 90 MMHG | WEIGHT: 140 LBS | HEART RATE: 86 BPM | TEMPERATURE: 99.2 F | SYSTOLIC BLOOD PRESSURE: 130 MMHG | OXYGEN SATURATION: 97 % | HEIGHT: 62 IN | BODY MASS INDEX: 25.76 KG/M2 | RESPIRATION RATE: 16 BRPM
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
## 2021-11-10 DIAGNOSIS — R39.9 UNSPECIFIED SYMPTOMS AND SIGNS INVOLVING THE GENITOURINARY SYSTEM: ICD-10-CM

## 2021-11-10 DIAGNOSIS — Z87.09 PERSONAL HISTORY OF OTHER DISEASES OF THE RESPIRATORY SYSTEM: ICD-10-CM

## 2021-11-10 DIAGNOSIS — R82.90 UNSPECIFIED ABNORMAL FINDINGS IN URINE: ICD-10-CM

## 2021-11-10 PROCEDURE — 99072 ADDL SUPL MATRL&STAF TM PHE: CPT

## 2021-11-10 PROCEDURE — 99214 OFFICE O/P EST MOD 30 MIN: CPT

## 2021-11-10 RX ORDER — METHYLPREDNISOLONE 4 MG/1
4 TABLET ORAL
Qty: 1 | Refills: 0 | Status: DISCONTINUED | COMMUNITY
Start: 2021-06-30 | End: 2021-11-10
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
## 2021-11-10 NOTE — PHYSICAL EXAM
[No Acute Distress] : no acute distress [Well Nourished] : well nourished [Well Developed] : well developed [Well-Appearing] : well-appearing [Normal Sclera/Conjunctiva] : normal sclera/conjunctiva [PERRL] : pupils equal round and reactive to light [EOMI] : extraocular movements intact [Normal Outer Ear/Nose] : the outer ears and nose were normal in appearance [Normal Oropharynx] : the oropharynx was normal [No JVD] : no jugular venous distention [No Lymphadenopathy] : no lymphadenopathy [Supple] : supple [Thyroid Normal, No Nodules] : the thyroid was normal and there were no nodules present [No Respiratory Distress] : no respiratory distress  [No Accessory Muscle Use] : no accessory muscle use [Clear to Auscultation] : lungs were clear to auscultation bilaterally [Normal Rate] : normal rate  [Regular Rhythm] : with a regular rhythm [Normal S1, S2] : normal S1 and S2 [No Murmur] : no murmur heard [No Carotid Bruits] : no carotid bruits [No Abdominal Bruit] : a ~M bruit was not heard ~T in the abdomen [No Varicosities] : no varicosities [Pedal Pulses Present] : the pedal pulses are present [No Edema] : there was no peripheral edema [No Palpable Aorta] : no palpable aorta [No Extremity Clubbing/Cyanosis] : no extremity clubbing/cyanosis [Soft] : abdomen soft [Non Tender] : non-tender [Non-distended] : non-distended [No Masses] : no abdominal mass palpated [No HSM] : no HSM [Normal Bowel Sounds] : normal bowel sounds [Normal Posterior Cervical Nodes] : no posterior cervical lymphadenopathy [Normal Anterior Cervical Nodes] : no anterior cervical lymphadenopathy [No CVA Tenderness] : no CVA  tenderness [No Spinal Tenderness] : no spinal tenderness [No Joint Swelling] : no joint swelling [Grossly Normal Strength/Tone] : grossly normal strength/tone [No Rash] : no rash [Coordination Grossly Intact] : coordination grossly intact [No Focal Deficits] : no focal deficits [Normal Gait] : normal gait [Deep Tendon Reflexes (DTR)] : deep tendon reflexes were 2+ and symmetric [Normal Affect] : the affect was normal [Normal Insight/Judgement] : insight and judgment were intact [de-identified] : Ecchymosis right hip
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
## 2022-03-15 ENCOUNTER — APPOINTMENT (OUTPATIENT)
Dept: INTERNAL MEDICINE | Facility: CLINIC | Age: 52
End: 2022-03-15
Payer: COMMERCIAL

## 2022-03-15 VITALS
WEIGHT: 150 LBS | HEART RATE: 87 BPM | RESPIRATION RATE: 16 BRPM | BODY MASS INDEX: 27.6 KG/M2 | HEIGHT: 62 IN | SYSTOLIC BLOOD PRESSURE: 110 MMHG | DIASTOLIC BLOOD PRESSURE: 60 MMHG | TEMPERATURE: 99.8 F | OXYGEN SATURATION: 97 %

## 2022-03-15 DIAGNOSIS — M25.551 PAIN IN RIGHT HIP: ICD-10-CM

## 2022-03-15 DIAGNOSIS — G89.29 LOW BACK PAIN, UNSPECIFIED: ICD-10-CM

## 2022-03-15 DIAGNOSIS — M54.50 LOW BACK PAIN, UNSPECIFIED: ICD-10-CM

## 2022-03-15 DIAGNOSIS — R07.9 CHEST PAIN, UNSPECIFIED: ICD-10-CM

## 2022-03-15 PROCEDURE — 99072 ADDL SUPL MATRL&STAF TM PHE: CPT

## 2022-03-15 PROCEDURE — 99214 OFFICE O/P EST MOD 30 MIN: CPT

## 2022-03-15 RX ORDER — NORETHINDRONE ACETATE AND ETHINYL ESTRADIOL AND FERROUS FUMARATE 1.5-30(21)
1.5-3 KIT ORAL
Qty: 28 | Refills: 0 | Status: DISCONTINUED | COMMUNITY
End: 2022-03-15
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
## 2022-04-11 LAB
25(OH)D3 SERPL-MCNC: 33.1 NG/ML
ALBUMIN SERPL ELPH-MCNC: 4.6 G/DL
ALP BLD-CCNC: 55 U/L
ALT SERPL-CCNC: 20 U/L
ANION GAP SERPL CALC-SCNC: 11 MMOL/L
APPEARANCE: CLEAR
AST SERPL-CCNC: 16 U/L
BACTERIA: NEGATIVE
BASOPHILS # BLD AUTO: 0.08 K/UL
BASOPHILS NFR BLD AUTO: 1 %
BILIRUB SERPL-MCNC: 1.1 MG/DL
BILIRUBIN URINE: NEGATIVE
BLOOD URINE: NEGATIVE
BUN SERPL-MCNC: 13 MG/DL
CALCIUM SERPL-MCNC: 9 MG/DL
CHLORIDE SERPL-SCNC: 103 MMOL/L
CHOLEST SERPL-MCNC: 185 MG/DL
CO2 SERPL-SCNC: 23 MMOL/L
COLOR: NORMAL
CREAT SERPL-MCNC: 0.89 MG/DL
EGFR: 78 ML/MIN/1.73M2
EOSINOPHIL # BLD AUTO: 0.2 K/UL
EOSINOPHIL NFR BLD AUTO: 2.6 %
GLUCOSE QUALITATIVE U: NEGATIVE
GLUCOSE SERPL-MCNC: 95 MG/DL
HCT VFR BLD CALC: 41.2 %
HDLC SERPL-MCNC: 67 MG/DL
HGB BLD-MCNC: 13 G/DL
HYALINE CASTS: 1 /LPF
IMM GRANULOCYTES NFR BLD AUTO: 0.4 %
IRON SATN MFR SERPL: 44 %
IRON SERPL-MCNC: 155 UG/DL
KETONES URINE: NEGATIVE
LDLC SERPL CALC-MCNC: 103 MG/DL
LEUKOCYTE ESTERASE URINE: ABNORMAL
LYMPHOCYTES # BLD AUTO: 2.12 K/UL
LYMPHOCYTES NFR BLD AUTO: 27.7 %
MAN DIFF?: NORMAL
MCHC RBC-ENTMCNC: 29.2 PG
MCHC RBC-ENTMCNC: 31.6 GM/DL
MCV RBC AUTO: 92.6 FL
MICROSCOPIC-UA: NORMAL
MONOCYTES # BLD AUTO: 0.61 K/UL
MONOCYTES NFR BLD AUTO: 8 %
NEUTROPHILS # BLD AUTO: 4.62 K/UL
NEUTROPHILS NFR BLD AUTO: 60.3 %
NITRITE URINE: NEGATIVE
NONHDLC SERPL-MCNC: 118 MG/DL
PH URINE: 6.5
PLATELET # BLD AUTO: 307 K/UL
POTASSIUM SERPL-SCNC: 5 MMOL/L
PROT SERPL-MCNC: 6.8 G/DL
PROTEIN URINE: NEGATIVE
RBC # BLD: 4.45 M/UL
RBC # FLD: 13.5 %
RED BLOOD CELLS URINE: 1 /HPF
SODIUM SERPL-SCNC: 137 MMOL/L
SPECIFIC GRAVITY URINE: 1.02
SQUAMOUS EPITHELIAL CELLS: 1 /HPF
TIBC SERPL-MCNC: 348 UG/DL
TRIGL SERPL-MCNC: 78 MG/DL
TSH SERPL-ACNC: 1.18 UIU/ML
UIBC SERPL-MCNC: 193 UG/DL
UROBILINOGEN URINE: NORMAL
WBC # FLD AUTO: 7.66 K/UL
WHITE BLOOD CELLS URINE: 1 /HPF

## 2022-04-15 ENCOUNTER — APPOINTMENT (OUTPATIENT)
Dept: INTERNAL MEDICINE | Facility: CLINIC | Age: 52
End: 2022-04-15
Payer: COMMERCIAL

## 2022-04-15 VITALS
WEIGHT: 152 LBS | HEART RATE: 69 BPM | RESPIRATION RATE: 16 BRPM | SYSTOLIC BLOOD PRESSURE: 112 MMHG | HEIGHT: 62 IN | TEMPERATURE: 98.5 F | DIASTOLIC BLOOD PRESSURE: 80 MMHG | BODY MASS INDEX: 27.97 KG/M2 | OXYGEN SATURATION: 98 %

## 2022-04-15 DIAGNOSIS — Z87.898 PERSONAL HISTORY OF OTHER SPECIFIED CONDITIONS: ICD-10-CM

## 2022-04-15 DIAGNOSIS — M25.521 PAIN IN RIGHT ELBOW: ICD-10-CM

## 2022-04-15 DIAGNOSIS — Z87.891 PERSONAL HISTORY OF NICOTINE DEPENDENCE: ICD-10-CM

## 2022-04-15 DIAGNOSIS — R19.8 OTHER SPECIFIED SYMPTOMS AND SIGNS INVOLVING THE DIGESTIVE SYSTEM AND ABDOMEN: ICD-10-CM

## 2022-04-15 DIAGNOSIS — Z01.818 ENCOUNTER FOR OTHER PREPROCEDURAL EXAMINATION: ICD-10-CM
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
## 2022-04-15 DIAGNOSIS — Z87.09 PERSONAL HISTORY OF OTHER DISEASES OF THE RESPIRATORY SYSTEM: ICD-10-CM

## 2022-04-15 PROCEDURE — 99396 PREV VISIT EST AGE 40-64: CPT
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
## 2022-04-15 NOTE — HEALTH RISK ASSESSMENT
[Former] : Former [Yes] : Yes [Monthly or less (1 pt)] : Monthly or less (1 point) [1 or 2 (0 pts)] : 1 or 2 (0 points) [Never (0 pts)] : Never (0 points) [No] : In the past 12 months have you used drugs other than those required for medical reasons? No [0] : 2) Feeling down, depressed, or hopeless: Not at all (0) [Very Good] : ~his/her~  mood as very good [YearQuit] : 2008
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
## 2022-05-05 ENCOUNTER — APPOINTMENT (OUTPATIENT)
Dept: ORTHOPEDIC SURGERY | Facility: CLINIC | Age: 52
End: 2022-05-05
Payer: COMMERCIAL

## 2022-05-05 DIAGNOSIS — S50.02XA CONTUSION OF LEFT ELBOW, INITIAL ENCOUNTER: ICD-10-CM

## 2022-05-05 DIAGNOSIS — S50.01XA CONTUSION OF RIGHT ELBOW, INITIAL ENCOUNTER: ICD-10-CM

## 2022-05-05 PROCEDURE — 99072 ADDL SUPL MATRL&STAF TM PHE: CPT

## 2022-05-05 PROCEDURE — 99204 OFFICE O/P NEW MOD 45 MIN: CPT

## 2022-05-05 PROCEDURE — 73080 X-RAY EXAM OF ELBOW: CPT | Mod: RT

## 2022-05-05 PROCEDURE — 73030 X-RAY EXAM OF SHOULDER: CPT | Mod: RT

## 2022-05-06 NOTE — ADDENDUM
[FreeTextEntry1] : Documented by Graeme Rm acting as a scribe for Dr. Kirk and Hilario Gunter PA-C on 05/05/2022. \par \par All medical record entries made by the Scribe were at my, Hilario Gunter's, direction and\par personally dictated by me on 05/05/2022. I have reviewed the chart and agree that the record\par accurately reflects my personal performance of the history, physical exam, procedure and imaging.
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
## 2022-05-06 NOTE — PHYSICAL EXAM
[de-identified] : Physical Exam: \par General: Well appearing, no acute distress \par Neurologic: A&Ox3, No focal deficits \par Head: NCAT without abrasions, lacerations, or ecchymosis to head, face, or scalp \par Eyes: No scleral icterus, no gross abnormalities \par Respiratory: Equal chest wall expansion bilaterally, no accessory muscle use \par Lymphatic: No lymphadenopathy palpated \par Skin: Warm and dry \par Psychiatric: Normal mood and affect\par \par Examination of the Right shoulder shows no obvious deformity, swelling or erythema. Shoulder sitting anteriorly.  Mild tenderness to palpation over the anterior shoulder. No AC joint tenderness. The patient demonstrates active/passive ROM of Forward Flexion to 165 degrees, External Rotation to 80 degrees and Internal Rotation to a mid lumbar level. The patient has a positive May test. and no pain with cross body adduction, lift off testing, AC compression testing or Yergason testing. The patient has 4+/5 strength to forward flexion with pronation, internal and external rotation. Pain with abduction.Compartments are soft and nontender. The patient has 2+ cap refill and sensation is intact in the hand. \par ·	Tests: May test positive, Neer negative negative drop arm test secondary to pain, bear hug test negative Napolean sign negative, cross arm adduction negative, lift off sign negative hornblowers sign negative, speeds test positive Yergason's test negative, bicipital groove tenderness, Mcguire's Active Compression test POS, whipple test POS, bicep's load II test negative\par \par Left shoulder shows no deformity. No tenderness to palpation over the biceps or AC joint. The patient has Forward Flexion to 170 degrees, External Rotation to 45 degrees and Internal Rotation to a mid lumbar level. 5/5 strength to forward flexion with pronation, internal and external rotation. Compartments are soft and nontender. 2+ cap refill. Sensation intact distally.\par \par The right elbow is examined and reveals no swelling or deformity.  There is exquisite hypersensitivity and tenderness to palpation over the proximal ulna.  There is no enlargement of the olecranon bursa.  There is mild tenderness over the cubital tunnel with palpable slight subluxation of the ulnar nerve.  There is negative Tinel's sign at the cubital tunnel and the carpal tunnel.  She has intact interosseous muscle function as well as  strength.  Her elbow is stable to valgus and varus stress and has a negative milking maneuver.  Pain with dorsiflexion to pain. No ulnar nerve subluxating, but inflamed and patient severely tender. Good  strength, able to hold ok sign. She has full pronation and supination and flexion from 0 to 130 degrees pain-free and fluid.\par  [de-identified] : 4 views of R shoulder were performed today and available for me to review. Results were discussed with the patient. They demonstrate no f/x, dislocation or other deformity.\par \par 3 views of R elbow were performed today and available for me to review. Results were discussed with the patient. They demonstrate no f/x, dislocation or other deformity.\par
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
## 2022-06-30 ENCOUNTER — APPOINTMENT (OUTPATIENT)
Dept: ORTHOPEDIC SURGERY | Facility: CLINIC | Age: 52
End: 2022-06-30

## 2022-06-30 VITALS
BODY MASS INDEX: 27.6 KG/M2 | WEIGHT: 150 LBS | SYSTOLIC BLOOD PRESSURE: 132 MMHG | DIASTOLIC BLOOD PRESSURE: 82 MMHG | HEIGHT: 62 IN | HEART RATE: 71 BPM

## 2022-06-30 DIAGNOSIS — M25.511 PAIN IN RIGHT SHOULDER: ICD-10-CM

## 2022-06-30 DIAGNOSIS — G89.29 PAIN IN RIGHT SHOULDER: ICD-10-CM

## 2022-06-30 PROCEDURE — 99072 ADDL SUPL MATRL&STAF TM PHE: CPT

## 2022-06-30 PROCEDURE — 99214 OFFICE O/P EST MOD 30 MIN: CPT

## 2022-06-30 NOTE — DISCUSSION/SUMMARY
[de-identified] : MARIYA VALDIVIA is a 52 year female being seen for f/u visit R shoulder and elbow pain. At last visit, patient elected for non operative tx options such as PT and mobic. She reports improvement in ROM, but mild to no relief in pain. In regards to her numbness and tingling, she reports those events are now mostly intermittent. She reports dull achy pain in shoulder with behind shoulder movements.\par \par We had a thorough discussion regarding the nature of her pain, the pathophysiology, as well as all treatment options. In regards to her Right (R) shoulder, Considering the patient's current presentation of pain being worse than prior and failing on greater than 3 months of conservative measures, an MRI is indicated at this time. A prescription for this was given to the patient. We will go over the MRI results with her upon obtaining the results in the office and advise her of further treatment options. She agrees with the above plan and all questions were answered.\par \par In regards to her Right (R) elbow, she has ulnar nerve entrapment of which I discussed operative and non-operative treatment modalities. An order for US guided ulnar nerve sheath cortisone injection was placed today to be performed by radiologist. It will serve as diagnostic and/or therapeutic purpose. Conservative measures of treatment include rest until asymptomatic, activity avoidance, NSAID's PRN, application to ice to the area 2-3x daily for 20 minutes, with gradual return to activities. All questions were answered and the patient verbalized understanding. The patient is in agreement with this treatment plan.

## 2022-06-30 NOTE — ADDENDUM
[FreeTextEntry1] : Documented by Graeme Rm acting as a scribe for Dr. Kirk and Hilario Gunter PA-C on 06/30/2022. \par \par All medical record entries made by the Scribe were at my, Dr. Kirk, and Hilario Gunter's, direction and\par personally dictated by me on 06/30/2022. I have reviewed the chart and agree that the record\par accurately reflects my personal performance of the history, physical exam, procedure and imaging.
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
## 2022-06-30 NOTE — HISTORY OF PRESENT ILLNESS
[Stable] : stable [5] : a maximum pain level of 5/10 [Direct Pressure] : worsened by direct pressure [Lifting] : worsened by lifting [___ mths] : [unfilled] month(s) ago [2] : a current pain level of 2/10 [4] : an average pain level of 4/10 [de-identified] : MARIYA VALDIVIA is a 52 year female being seen for f/u visit R shoulder and elbow pain. At last visit, patient elected for non operative tx options such as PT and mobic. She reports improvement in ROM, but mild to no relief in pain. In regards to her numbness and tingling, she reports those events are now mostly intermittent. She reports dull achy pain in shoulder with behind shoulder movements.

## 2022-06-30 NOTE — PHYSICAL EXAM
[de-identified] : Physical Exam: \par General: Well appearing, no acute distress \par Neurologic: A&Ox3, No focal deficits \par Head: NCAT without abrasions, lacerations, or ecchymosis to head, face, or scalp \par Eyes: No scleral icterus, no gross abnormalities \par Respiratory: Equal chest wall expansion bilaterally, no accessory muscle use \par Lymphatic: No lymphadenopathy palpated \par Skin: Warm and dry \par Psychiatric: Normal mood and affect\par \par Examination of the Right shoulder shows no obvious deformity, swelling or erythema. Shoulder sitting anteriorly.  Mild tenderness to palpation over the anterior shoulder. No AC joint tenderness. The patient demonstrates active/passive ROM of Forward Flexion to 165 degrees, External Rotation to 80 degrees and Internal Rotation to a mid lumbar level. The patient has a positive May test. and no pain with cross body adduction, lift off testing, AC compression testing or Yergason testing. The patient has 4+/5 strength to forward flexion with pronation, internal and external rotation. Pain with abduction.Compartments are soft and nontender. The patient has 2+ cap refill and sensation is intact in the hand. \par ·	Tests: May test positive, Neer negative negative drop arm test secondary to pain, bear hug test negative Napolean sign negative, cross arm adduction negative, lift off sign negative hornblowers sign negative, speeds test positive Yergason's test negative, bicipital groove tenderness, Mcguire's Active Compression test POS, whipple test POS, bicep's load II test negative\par \par Left shoulder shows no deformity. No tenderness to palpation over the biceps or AC joint. The patient has Forward Flexion to 170 degrees, External Rotation to 45 degrees and Internal Rotation to a mid lumbar level. 5/5 strength to forward flexion with pronation, internal and external rotation. Compartments are soft and nontender. 2+ cap refill. Sensation intact distally.\par \par The right elbow is examined and reveals no swelling or deformity.  There is exquisite hypersensitivity and tenderness to palpation over the proximal ulna.  There is no enlargement of the olecranon bursa.  There is mild tenderness over the cubital tunnel with palpable slight subluxation of the ulnar nerve.  There is negative Tinel's sign at the cubital tunnel and the carpal tunnel.  She has intact interosseous muscle function as well as  strength.  Her elbow is stable to valgus and varus stress and has a negative milking maneuver.  Pain with dorsiflexion to pain. No ulnar nerve subluxating, but inflamed and patient severely tender. Good  strength, able to hold ok sign. She has full pronation and supination and flexion from 0 to 130 degrees pain-free and fluid.\par

## 2022-07-11 ENCOUNTER — APPOINTMENT (OUTPATIENT)
Dept: MRI IMAGING | Facility: CLINIC | Age: 52
End: 2022-07-11
Payer: COMMERCIAL

## 2022-07-11 ENCOUNTER — OUTPATIENT (OUTPATIENT)
Dept: OUTPATIENT SERVICES | Facility: HOSPITAL | Age: 52
LOS: 1 days | End: 2022-07-11
Payer: COMMERCIAL

## 2022-07-11 DIAGNOSIS — S46.011A STRAIN OF MUSCLE(S) AND TENDON(S) OF THE ROTATOR CUFF OF RIGHT SHOULDER, INITIAL ENCOUNTER: ICD-10-CM

## 2022-07-11 PROCEDURE — 73221 MRI JOINT UPR EXTREM W/O DYE: CPT

## 2022-07-11 PROCEDURE — 73221 MRI JOINT UPR EXTREM W/O DYE: CPT | Mod: 26,RT,76

## 2022-07-11 PROCEDURE — 73221 MRI JOINT UPR EXTREM W/O DYE: CPT | Mod: 26,RT

## 2022-07-18 ENCOUNTER — APPOINTMENT (OUTPATIENT)
Dept: ORTHOPEDIC SURGERY | Facility: CLINIC | Age: 52
End: 2022-07-18

## 2022-07-18 PROCEDURE — 99442: CPT

## 2022-07-25 ENCOUNTER — APPOINTMENT (OUTPATIENT)
Dept: NEUROLOGY | Facility: CLINIC | Age: 52
End: 2022-07-25

## 2022-07-25 VITALS
HEART RATE: 74 BPM | SYSTOLIC BLOOD PRESSURE: 129 MMHG | DIASTOLIC BLOOD PRESSURE: 80 MMHG | TEMPERATURE: 97.8 F | HEIGHT: 62 IN | BODY MASS INDEX: 27.6 KG/M2 | WEIGHT: 150 LBS

## 2022-07-25 DIAGNOSIS — F07.81 POSTCONCUSSIONAL SYNDROME: ICD-10-CM

## 2022-07-25 PROCEDURE — 99072 ADDL SUPL MATRL&STAF TM PHE: CPT

## 2022-07-25 PROCEDURE — 99244 OFF/OP CNSLTJ NEW/EST MOD 40: CPT

## 2022-07-25 RX ORDER — METHOCARBAMOL 500 MG/1
TABLET, FILM COATED ORAL
Refills: 0 | Status: DISCONTINUED | COMMUNITY
End: 2022-07-25
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
## 2022-07-25 NOTE — DATA REVIEWED
[de-identified] : 12/10/2021: MRI brain and IACs WAW contrast: Normal MRI of the brain.  No cerebral pontine angle or internal auditory canal mass

## 2022-07-25 NOTE — PHYSICAL EXAM
[Person] : oriented to person [Place] : oriented to place [Time] : oriented to time [Registration Intact] : recent registration memory intact [Concentration Intact] : normal concentrating ability [Naming Objects] : no difficulty naming common objects [Repeating Phrases] : no difficulty repeating a phrase [Fluency] : fluency intact [Comprehension] : comprehension intact [Past History] : adequate knowledge of personal past history [Cranial Nerves Optic (II)] : visual acuity intact bilaterally,  visual fields full to confrontation, pupils equal round and reactive to light [Cranial Nerves Oculomotor (III)] : extraocular motion intact [Cranial Nerves Trigeminal (V)] : facial sensation intact symmetrically [Cranial Nerves Facial (VII)] : face symmetrical [Cranial Nerves Vestibulocochlear (VIII)] : hearing was intact bilaterally [Cranial Nerves Glossopharyngeal (IX)] : tongue and palate midline [Cranial Nerves Accessory (XI - Cranial And Spinal)] : head turning and shoulder shrug symmetric [Cranial Nerves Hypoglossal (XII)] : there was no tongue deviation with protrusion [Motor Tone] : muscle tone was normal in all four extremities [Motor Strength] : muscle strength was normal in all four extremities [No Muscle Atrophy] : normal bulk in all four extremities [Sensation Tactile Decrease] : light touch was intact [Abnormal Walk] : normal gait [2+] : Patella left 2+ [PERRL With Normal Accommodation] : pupils were equal in size, round, reactive to light, with normal accommodation [Extraocular Movements] : extraocular movements were intact [Full Visual Field] : full visual field [Outer Ear] : the ears and nose were normal in appearance [Hearing Threshold Finger Rub Not [place]] : hearing was normal [Auscultation Breath Sounds / Voice Sounds] : lungs were clear to auscultation bilaterally [Heart Rate And Rhythm] : heart rate was normal and rhythm regular [Heart Sounds] : normal S1 and S2 [Arterial Pulses Carotid] : carotid pulses were normal with no bruits [Edema] : there was no peripheral edema [Involuntary Movements] : no involuntary movements were seen [Musculoskeletal - Swelling] : no joint swelling seen [] : no rash [General Appearance - Alert] : alert [General Appearance - In No Acute Distress] : in no acute distress [Oriented To Time, Place, And Person] : oriented to person, place, and time [Impaired Insight] : insight and judgment were intact [Affect] : the affect was normal [Past-pointing] : there was no past-pointing [Tremor] : no tremor present [Coordination - Dysmetria Impaired Finger-to-Nose Bilateral] : not present [1+] : Ankle jerk left 1+ [Plantar Reflex Right Only] : normal on the right [Plantar Reflex Left Only] : normal on the left [Neck Cervical Mass (___cm)] : no neck mass was observed [FreeTextEntry1] : Neck ROM: Lateral neck movements slightly limited due to pain
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
## 2022-07-25 NOTE — REVIEW OF SYSTEMS
[Negative] : Heme/Lymph [Dizziness] : dizziness [Vertigo] : vertigo [Tension Headache] : tension-type headaches [As Noted in HPI] : as noted in HPI [Joint Pain] : joint pain [FreeTextEntry9] : Neck pain

## 2022-08-09 ENCOUNTER — OUTPATIENT (OUTPATIENT)
Dept: OUTPATIENT SERVICES | Facility: HOSPITAL | Age: 52
LOS: 1 days | End: 2022-08-09
Payer: COMMERCIAL

## 2022-08-09 ENCOUNTER — APPOINTMENT (OUTPATIENT)
Dept: MRI IMAGING | Facility: CLINIC | Age: 52
End: 2022-08-09

## 2022-08-09 DIAGNOSIS — S16.1XXA STRAIN OF MUSCLE, FASCIA AND TENDON AT NECK LEVEL, INITIAL ENCOUNTER: ICD-10-CM
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
## 2022-08-09 DIAGNOSIS — R42 DIZZINESS AND GIDDINESS: ICD-10-CM

## 2022-08-09 PROCEDURE — 72141 MRI NECK SPINE W/O DYE: CPT

## 2022-08-09 PROCEDURE — 72141 MRI NECK SPINE W/O DYE: CPT | Mod: 26

## 2022-08-18 ENCOUNTER — NON-APPOINTMENT (OUTPATIENT)
Age: 52
End: 2022-08-18

## 2022-09-26 ENCOUNTER — APPOINTMENT (OUTPATIENT)
Dept: ULTRASOUND IMAGING | Facility: CLINIC | Age: 52
End: 2022-09-26

## 2022-09-26 ENCOUNTER — RESULT REVIEW (OUTPATIENT)
Age: 52
End: 2022-09-26

## 2022-09-26 ENCOUNTER — OUTPATIENT (OUTPATIENT)
Dept: OUTPATIENT SERVICES | Facility: HOSPITAL | Age: 52
LOS: 1 days | End: 2022-09-26
Payer: COMMERCIAL

## 2022-09-26 DIAGNOSIS — G56.21 LESION OF ULNAR NERVE, RIGHT UPPER LIMB: ICD-10-CM

## 2022-09-26 PROCEDURE — 20611 DRAIN/INJ JOINT/BURSA W/US: CPT | Mod: RT

## 2022-09-26 PROCEDURE — 20611 DRAIN/INJ JOINT/BURSA W/US: CPT

## 2022-10-19 ENCOUNTER — APPOINTMENT (OUTPATIENT)
Dept: INTERNAL MEDICINE | Facility: CLINIC | Age: 52
End: 2022-10-19

## 2022-10-19 VITALS
BODY MASS INDEX: 27.6 KG/M2 | HEIGHT: 62 IN | TEMPERATURE: 98.6 F | OXYGEN SATURATION: 97 % | DIASTOLIC BLOOD PRESSURE: 84 MMHG | WEIGHT: 150 LBS | SYSTOLIC BLOOD PRESSURE: 120 MMHG | HEART RATE: 93 BPM

## 2022-10-19 DIAGNOSIS — R35.0 FREQUENCY OF MICTURITION: ICD-10-CM

## 2022-10-19 LAB
BILIRUB UR QL STRIP: NEGATIVE
CLARITY UR: NORMAL
COLLECTION METHOD: NORMAL
GLUCOSE UR-MCNC: NEGATIVE
HCG UR QL: 0.2 EU/DL
HGB UR QL STRIP.AUTO: NORMAL
KETONES UR-MCNC: NEGATIVE
LEUKOCYTE ESTERASE UR QL STRIP: NEGATIVE
NITRITE UR QL STRIP: NEGATIVE
PH UR STRIP: 5.5
PROT UR STRIP-MCNC: NEGATIVE
SP GR UR STRIP: 1.02

## 2022-10-19 PROCEDURE — 99214 OFFICE O/P EST MOD 30 MIN: CPT

## 2022-10-19 PROCEDURE — 81003 URINALYSIS AUTO W/O SCOPE: CPT | Mod: QW

## 2022-10-19 NOTE — HISTORY OF PRESENT ILLNESS
[FreeTextEntry8] : Pt here with  chills and   urinary freq and  hx of pylenephritis.  feels sweats,  back   chills have retruned and now feels .  Has not been  checking tmep. Began to felt ill  last week  with chills and sweats at night.   Has had freq  urination , no painful urination. Has developed URI sx   feels tight chest,   hx of the same.  Has been coughing.    no sore thoat no runny nose   lives with hsband.   Has had covid 2020

## 2022-10-20 ENCOUNTER — NON-APPOINTMENT (OUTPATIENT)
Age: 52
End: 2022-10-20

## 2022-10-20 LAB
APPEARANCE: CLEAR
BACTERIA: NEGATIVE
BILIRUBIN URINE: NEGATIVE
BLOOD URINE: NEGATIVE
COLOR: YELLOW
GLUCOSE QUALITATIVE U: NEGATIVE
HYALINE CASTS: 4 /LPF
KETONES URINE: NEGATIVE
LEUKOCYTE ESTERASE URINE: ABNORMAL
MICROSCOPIC-UA: NORMAL
NITRITE URINE: NEGATIVE
PH URINE: 5.5
PROTEIN URINE: NEGATIVE
RAPID RVP RESULT: NOT DETECTED
RED BLOOD CELLS URINE: 2 /HPF
SARS-COV-2 RNA PNL RESP NAA+PROBE: NOT DETECTED
SPECIFIC GRAVITY URINE: 1.02
SQUAMOUS EPITHELIAL CELLS: 5 /HPF
UROBILINOGEN URINE: NORMAL
WHITE BLOOD CELLS URINE: 6 /HPF

## 2022-10-23 LAB — BACTERIA UR CULT: NORMAL

## 2022-10-26 ENCOUNTER — NON-APPOINTMENT (OUTPATIENT)
Age: 52
End: 2022-10-26

## 2022-10-27 ENCOUNTER — APPOINTMENT (OUTPATIENT)
Dept: ORTHOPEDIC SURGERY | Facility: CLINIC | Age: 52
End: 2022-10-27

## 2022-10-27 DIAGNOSIS — S46.011A STRAIN OF MUSCLE(S) AND TENDON(S) OF THE ROTATOR CUFF OF RIGHT SHOULDER, INITIAL ENCOUNTER: ICD-10-CM

## 2022-10-27 PROCEDURE — 99072 ADDL SUPL MATRL&STAF TM PHE: CPT

## 2022-10-27 PROCEDURE — 99214 OFFICE O/P EST MOD 30 MIN: CPT
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
## 2022-10-27 NOTE — PHYSICAL EXAM
[de-identified] : Physical Exam: \par General: Well appearing, no acute distress \par Neurologic: A&Ox3, No focal deficits \par Head: NCAT without abrasions, lacerations, or ecchymosis to head, face, or scalp \par Eyes: No scleral icterus, no gross abnormalities \par Respiratory: Equal chest wall expansion bilaterally, no accessory muscle use \par Lymphatic: No lymphadenopathy palpated \par Skin: Warm and dry \par Psychiatric: Normal mood and affect\par \par Examination of the Right shoulder shows no obvious deformity, swelling or erythema. Shoulder sitting anteriorly.  Mild tenderness to palpation over the anterior shoulder. No AC joint tenderness. The patient demonstrates active/passive ROM of Forward Flexion to 165 degrees, External Rotation to 80 degrees and Internal Rotation to a mid lumbar level. The patient has a positive May test. and no pain with cross body adduction, lift off testing, AC compression testing or Yergason testing. The patient has 4+/5 strength to forward flexion with pronation, internal and external rotation. Pain with abduction.Compartments are soft and nontender. The patient has 2+ cap refill and sensation is intact in the hand. \par ·	Tests: May test positive, Neer negative negative drop arm test secondary to pain, bear hug test negative Napolean sign negative, cross arm adduction negative, lift off sign negative hornblowers sign negative, speeds test positive Yergason's test negative, bicipital groove tenderness, Mcguire's Active Compression test POS, whipple test POS, bicep's load II test negative\par \par Left shoulder shows no deformity. No tenderness to palpation over the biceps or AC joint. The patient has Forward Flexion to 170 degrees, External Rotation to 45 degrees and Internal Rotation to a mid lumbar level. 5/5 strength to forward flexion with pronation, internal and external rotation. Compartments are soft and nontender. 2+ cap refill. Sensation intact distally.\par \par The right elbow is examined and reveals no swelling or deformity.  There is exquisite hypersensitivity and tenderness to palpation over the proximal ulna.  There is no enlargement of the olecranon bursa.  There is mild tenderness over the cubital tunnel with palpable slight subluxation of the ulnar nerve.  There is negative Tinel's sign at the cubital tunnel and the carpal tunnel.  She has intact interosseous muscle function as well as  strength.  Her elbow is stable to valgus and varus stress and has a negative milking maneuver.  Pain with dorsiflexion to pain. No ulnar nerve subluxating, but inflamed and patient severely tender. Good  strength, able to hold ok sign. She has full pronation and supination and flexion from 0 to 130 degrees pain-free and fluid.\par

## 2022-10-27 NOTE — DISCUSSION/SUMMARY
[de-identified] : I had a lengthy discussion with the patient regarding their current condition. We discussed the treatment options including operative and nonoperative management. At this time I recommended conservative management.  She has an appointment with her neurologist next week and I encouraged her to discuss EMG testing with the neurologist team.  She may be a good candidate as she did not get any improvement from a cubital tunnel injection.  She may have a component of cervical radiculopathy.  In terms of the shoulder I offered her cortisone injection but she would like to hold off.  She will follow-up with us after consultation with neurology.  All of her questions were answered.  She was also referred to Dr. Cain or Dr. Fuentes for consultation of her lower back.

## 2022-11-02 ENCOUNTER — APPOINTMENT (OUTPATIENT)
Dept: NEUROLOGY | Facility: CLINIC | Age: 52
End: 2022-11-02

## 2022-11-02 VITALS
DIASTOLIC BLOOD PRESSURE: 76 MMHG | BODY MASS INDEX: 27.6 KG/M2 | HEART RATE: 83 BPM | HEIGHT: 62 IN | TEMPERATURE: 97.8 F | WEIGHT: 150 LBS | SYSTOLIC BLOOD PRESSURE: 118 MMHG

## 2022-11-02 DIAGNOSIS — M47.812 SPONDYLOSIS W/OUT MYELOPATHY OR RADICULOPATHY, CERVICAL REGION: ICD-10-CM

## 2022-11-02 PROCEDURE — 99072 ADDL SUPL MATRL&STAF TM PHE: CPT

## 2022-11-02 PROCEDURE — 99214 OFFICE O/P EST MOD 30 MIN: CPT

## 2022-11-02 RX ORDER — MELOXICAM 15 MG/1
15 TABLET ORAL
Qty: 21 | Refills: 2 | Status: DISCONTINUED | COMMUNITY
Start: 2022-05-05 | End: 2022-11-02

## 2022-11-02 RX ORDER — METHYLPREDNISOLONE 4 MG/1
4 TABLET ORAL
Qty: 1 | Refills: 0 | Status: DISCONTINUED | COMMUNITY
Start: 2022-10-19 | End: 2022-11-02

## 2022-11-02 RX ORDER — CIPROFLOXACIN HYDROCHLORIDE 500 MG/1
500 TABLET, FILM COATED ORAL
Qty: 14 | Refills: 0 | Status: DISCONTINUED | COMMUNITY
Start: 2022-10-19 | End: 2022-11-02

## 2022-11-02 NOTE — DATA REVIEWED
[de-identified] : 12/10/2021: MRI brain and IACs WAW contrast: Normal MRI of the brain.  No cerebral pontine angle or internal auditory canal mass [de-identified] : 8/18/2022: MRI cervical spine: Posterior disc osteophyte complex contributes to mild to moderate spinal canal stenosis at C5-6 and C6-7.  No high-grade cervical spinal canal or neural canal stenosis

## 2022-11-02 NOTE — REVIEW OF SYSTEMS
[Dizziness] : dizziness [As Noted in HPI] : as noted in HPI [Joint Pain] : joint pain [Negative] : Heme/Lymph [Numbness] : numbness [Tingling] : tingling [Vertigo] : no vertigo [Tension Headache] : no tension-type headache [FreeTextEntry9] : Neck pain
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
## 2022-11-02 NOTE — HISTORY OF PRESENT ILLNESS
[FreeTextEntry1] : Patient is here for follow-up visit today, last seen on 7/25/2022.  Patient reports she attended vestibular therapy, noted remarkable improvement of dizziness and vertigo, was almost symptom-free, however days ago she was bending to  something, all of a sudden she felt dizzy and almost fell, did not sustain any injuries, her symptoms are minimal at present.\par \par Patient reports remarkable improvement of neck pain after she started PT, she reports she is able to rotate her neck and is able to sleep without any difficulty.  MRI C-spine reveals DOC at C 5/6 - C 6/7, causing mild to moderate spinal canal stenosis.  \par \par Patient brings to my attention that she continues to have numbness in the right pinky and ring fingers, she has followed up with orthopedist Dr. Kirk, she had an injection at the right elbow which did not provide any relief, Dr. Kirk has recommended EMG/NCV studies before any further interventions

## 2022-11-02 NOTE — PHYSICAL EXAM
[General Appearance - Alert] : alert [General Appearance - In No Acute Distress] : in no acute distress [Oriented To Time, Place, And Person] : oriented to person, place, and time [Impaired Insight] : insight and judgment were intact [Affect] : the affect was normal [Person] : oriented to person [Place] : oriented to place [Time] : oriented to time [Registration Intact] : recent registration memory intact [Concentration Intact] : normal concentrating ability [Naming Objects] : no difficulty naming common objects [Repeating Phrases] : no difficulty repeating a phrase [Fluency] : fluency intact [Comprehension] : comprehension intact [Past History] : adequate knowledge of personal past history [Cranial Nerves Optic (II)] : visual acuity intact bilaterally,  visual fields full to confrontation, pupils equal round and reactive to light [Cranial Nerves Oculomotor (III)] : extraocular motion intact [Cranial Nerves Trigeminal (V)] : facial sensation intact symmetrically [Cranial Nerves Facial (VII)] : face symmetrical [Cranial Nerves Vestibulocochlear (VIII)] : hearing was intact bilaterally [Cranial Nerves Glossopharyngeal (IX)] : tongue and palate midline [Cranial Nerves Accessory (XI - Cranial And Spinal)] : head turning and shoulder shrug symmetric [Cranial Nerves Hypoglossal (XII)] : there was no tongue deviation with protrusion [Motor Tone] : muscle tone was normal in all four extremities [Motor Strength] : muscle strength was normal in all four extremities [No Muscle Atrophy] : normal bulk in all four extremities [Sensation Tactile Decrease] : light touch was intact [Abnormal Walk] : normal gait [2+] : Patella left 2+ [1+] : Ankle jerk left 1+ [PERRL With Normal Accommodation] : pupils were equal in size, round, reactive to light, with normal accommodation [Extraocular Movements] : extraocular movements were intact [Full Visual Field] : full visual field [Outer Ear] : the ears and nose were normal in appearance [Hearing Threshold Finger Rub Not [place]] : hearing was normal [Neck Cervical Mass (___cm)] : no neck mass was observed [] : no respiratory distress [Auscultation Breath Sounds / Voice Sounds] : lungs were clear to auscultation bilaterally [Heart Rate And Rhythm] : heart rate was normal and rhythm regular [Heart Sounds] : normal S1 and S2 [Pain / Temp Decrease Hand Ulnar 1-1/2 Digits] : diminished right ulnar 1-1/2 digits [Past-pointing] : there was no past-pointing [Tremor] : no tremor present [Coordination - Dysmetria Impaired Finger-to-Nose Bilateral] : not present [Plantar Reflex Right Only] : normal on the right [Plantar Reflex Left Only] : normal on the left [FreeTextEntry1] : Neck ROM: Lateral neck movements much improved

## 2022-11-02 NOTE — REASON FOR VISIT
[Follow-Up: _____] : a [unfilled] follow-up visit [FreeTextEntry1] : For dizziness, neck pain and discuss MRI cervical spine results

## 2022-11-10 ENCOUNTER — RX RENEWAL (OUTPATIENT)
Age: 52
End: 2022-11-10

## 2022-12-04 ENCOUNTER — RX RENEWAL (OUTPATIENT)
Age: 52
End: 2022-12-04

## 2022-12-15 ENCOUNTER — EMERGENCY (EMERGENCY)
Facility: HOSPITAL | Age: 52
LOS: 0 days | Discharge: HOME | End: 2022-12-16
Attending: EMERGENCY MEDICINE
Payer: COMMERCIAL

## 2022-12-15 VITALS
WEIGHT: 119.93 LBS | RESPIRATION RATE: 18 BRPM | SYSTOLIC BLOOD PRESSURE: 148 MMHG | DIASTOLIC BLOOD PRESSURE: 98 MMHG | TEMPERATURE: 98 F | OXYGEN SATURATION: 100 % | HEIGHT: 60 IN | HEART RATE: 78 BPM

## 2022-12-15 DIAGNOSIS — K29.00 ACUTE GASTRITIS WITHOUT BLEEDING: ICD-10-CM

## 2022-12-15 DIAGNOSIS — K21.9 GASTRO-ESOPHAGEAL REFLUX DISEASE WITHOUT ESOPHAGITIS: ICD-10-CM

## 2022-12-15 DIAGNOSIS — R10.13 EPIGASTRIC PAIN: ICD-10-CM

## 2022-12-15 LAB
ALBUMIN SERPL ELPH-MCNC: 3.9 G/DL — SIGNIFICANT CHANGE UP (ref 3.3–5)
ALP SERPL-CCNC: 68 U/L — SIGNIFICANT CHANGE UP (ref 40–120)
ALT FLD-CCNC: 38 U/L — SIGNIFICANT CHANGE UP (ref 12–78)
ANION GAP SERPL CALC-SCNC: 5 MMOL/L — SIGNIFICANT CHANGE UP (ref 5–17)
AST SERPL-CCNC: 19 U/L — SIGNIFICANT CHANGE UP (ref 15–37)
BASOPHILS # BLD AUTO: 0.11 K/UL — SIGNIFICANT CHANGE UP (ref 0–0.2)
BASOPHILS NFR BLD AUTO: 1.2 % — SIGNIFICANT CHANGE UP (ref 0–2)
BILIRUB SERPL-MCNC: 0.4 MG/DL — SIGNIFICANT CHANGE UP (ref 0.2–1.2)
BUN SERPL-MCNC: 18 MG/DL — SIGNIFICANT CHANGE UP (ref 7–23)
CALCIUM SERPL-MCNC: 8.9 MG/DL — SIGNIFICANT CHANGE UP (ref 8.5–10.1)
CHLORIDE SERPL-SCNC: 106 MMOL/L — SIGNIFICANT CHANGE UP (ref 96–108)
CO2 SERPL-SCNC: 29 MMOL/L — SIGNIFICANT CHANGE UP (ref 22–31)
CREAT SERPL-MCNC: 0.81 MG/DL — SIGNIFICANT CHANGE UP (ref 0.5–1.3)
EGFR: 87 ML/MIN/1.73M2 — SIGNIFICANT CHANGE UP
EOSINOPHIL # BLD AUTO: 0.33 K/UL — SIGNIFICANT CHANGE UP (ref 0–0.5)
EOSINOPHIL NFR BLD AUTO: 3.7 % — SIGNIFICANT CHANGE UP (ref 0–6)
GLUCOSE SERPL-MCNC: 96 MG/DL — SIGNIFICANT CHANGE UP (ref 70–99)
HCT VFR BLD CALC: 41.2 % — SIGNIFICANT CHANGE UP (ref 34.5–45)
HGB BLD-MCNC: 13.6 G/DL — SIGNIFICANT CHANGE UP (ref 11.5–15.5)
IMM GRANULOCYTES NFR BLD AUTO: 0.2 % — SIGNIFICANT CHANGE UP (ref 0–0.9)
LIDOCAIN IGE QN: 113 U/L — SIGNIFICANT CHANGE UP (ref 73–393)
LYMPHOCYTES # BLD AUTO: 2.88 K/UL — SIGNIFICANT CHANGE UP (ref 1–3.3)
LYMPHOCYTES # BLD AUTO: 32.1 % — SIGNIFICANT CHANGE UP (ref 13–44)
MCHC RBC-ENTMCNC: 30 PG — SIGNIFICANT CHANGE UP (ref 27–34)
MCHC RBC-ENTMCNC: 33 GM/DL — SIGNIFICANT CHANGE UP (ref 32–36)
MCV RBC AUTO: 90.7 FL — SIGNIFICANT CHANGE UP (ref 80–100)
MONOCYTES # BLD AUTO: 0.69 K/UL — SIGNIFICANT CHANGE UP (ref 0–0.9)
MONOCYTES NFR BLD AUTO: 7.7 % — SIGNIFICANT CHANGE UP (ref 2–14)
NEUTROPHILS # BLD AUTO: 4.94 K/UL — SIGNIFICANT CHANGE UP (ref 1.8–7.4)
NEUTROPHILS NFR BLD AUTO: 55.1 % — SIGNIFICANT CHANGE UP (ref 43–77)
PLATELET # BLD AUTO: 320 K/UL — SIGNIFICANT CHANGE UP (ref 150–400)
POTASSIUM SERPL-MCNC: 3.9 MMOL/L — SIGNIFICANT CHANGE UP (ref 3.5–5.3)
POTASSIUM SERPL-SCNC: 3.9 MMOL/L — SIGNIFICANT CHANGE UP (ref 3.5–5.3)
PROT SERPL-MCNC: 7.4 GM/DL — SIGNIFICANT CHANGE UP (ref 6–8.3)
RBC # BLD: 4.54 M/UL — SIGNIFICANT CHANGE UP (ref 3.8–5.2)
RBC # FLD: 12.6 % — SIGNIFICANT CHANGE UP (ref 10.3–14.5)
SODIUM SERPL-SCNC: 140 MMOL/L — SIGNIFICANT CHANGE UP (ref 135–145)
TROPONIN I, HIGH SENSITIVITY RESULT: 4.67 NG/L — SIGNIFICANT CHANGE UP
WBC # BLD: 8.97 K/UL — SIGNIFICANT CHANGE UP (ref 3.8–10.5)
WBC # FLD AUTO: 8.97 K/UL — SIGNIFICANT CHANGE UP (ref 3.8–10.5)

## 2022-12-15 PROCEDURE — 83690 ASSAY OF LIPASE: CPT

## 2022-12-15 PROCEDURE — 93005 ELECTROCARDIOGRAM TRACING: CPT

## 2022-12-15 PROCEDURE — 99283 EMERGENCY DEPT VISIT LOW MDM: CPT

## 2022-12-15 PROCEDURE — 85025 COMPLETE CBC W/AUTO DIFF WBC: CPT

## 2022-12-15 PROCEDURE — 80053 COMPREHEN METABOLIC PANEL: CPT

## 2022-12-15 PROCEDURE — 99285 EMERGENCY DEPT VISIT HI MDM: CPT

## 2022-12-15 PROCEDURE — 84484 ASSAY OF TROPONIN QUANT: CPT

## 2022-12-15 PROCEDURE — 93010 ELECTROCARDIOGRAM REPORT: CPT

## 2022-12-15 PROCEDURE — 36415 COLL VENOUS BLD VENIPUNCTURE: CPT

## 2022-12-15 RX ORDER — FAMOTIDINE 10 MG/ML
20 INJECTION INTRAVENOUS ONCE
Refills: 0 | Status: COMPLETED | OUTPATIENT
Start: 2022-12-15 | End: 2022-12-15

## 2022-12-15 RX ORDER — LIDOCAINE 4 G/100G
10 CREAM TOPICAL ONCE
Refills: 0 | Status: COMPLETED | OUTPATIENT
Start: 2022-12-15 | End: 2022-12-15

## 2022-12-15 RX ORDER — SUCRALFATE 1 G
1 TABLET ORAL ONCE
Refills: 0 | Status: COMPLETED | OUTPATIENT
Start: 2022-12-15 | End: 2022-12-15

## 2022-12-15 RX ADMIN — LIDOCAINE 10 MILLILITER(S): 4 CREAM TOPICAL at 20:56

## 2022-12-15 RX ADMIN — FAMOTIDINE 20 MILLIGRAM(S): 10 INJECTION INTRAVENOUS at 20:56

## 2022-12-15 RX ADMIN — Medication 30 MILLILITER(S): at 20:56

## 2022-12-15 RX ADMIN — Medication 1 GRAM(S): at 20:57

## 2022-12-15 NOTE — ED STATDOCS - NS ED ROS FT
Constitutional: No fevers, chills, or sweats.  Cardiac: No chest pain, exertional dyspnea, orthopnea  Respiratory: No shortness of breath, no cough  GI: No abdominal pain, no N/V/D  Neuro: No headaches, no neck pain/stiffness, no numbness  All other systems reviewed and are negative unless otherwise stated in the HPI. Constitutional: No fevers, chills, or sweats.  Cardiac: + burning epigastric/chest pain, no exertional dyspnea, orthopnea  Respiratory: No shortness of breath, no cough  GI: No abdominal pain, no N/V/D  Neuro: No headaches, no neck pain/stiffness, no numbness  All other systems reviewed and are negative unless otherwise stated in the HPI.

## 2022-12-15 NOTE — ED ADULT TRIAGE NOTE - CHIEF COMPLAINT QUOTE
pt arrived c/o GERD since Sunday. pt states she has been diagnosed with GERD in the past but the pain is more intense than before. pt describes pain as " burning". states " I feel pain when I swallow and feel an intense burning like heart burn". no distress noted. pt states she take protonix regularly but it did not help. states the pain becomes worse when laying down. pt ambulatory in triage.

## 2022-12-15 NOTE — ED STATDOCS - OBJECTIVE STATEMENT
52 year old female with PMHx of GERD presents to the ED complaining of burning epigastric pain since 12/11. Pt states this pain is more intense than prior episodes of GERD. Endorses pain worsens with swallowing and laying down. Pt took Protonix without relief. 52 year old female with PMHx of GERD presents to the ED complaining of intermittent episodes of burning epigastric/chest pain x 3 weeks, constant since 12/11. Pt states this pain is more intense than prior episodes of GERD. Endorses pain worsens with swallowing and laying down. Denies dyspnea on exertion, SOB,  nausea, vomiting, abdominal pain, blood in stool, diarrhea, or any other complaints. Pt has been on Protonix every night x 3 weeks without relief. Denies taking a lot of NSAIDs or drinking a lot of milk. Denies drinking or drug use.

## 2022-12-15 NOTE — ED STATDOCS - PHYSICAL EXAMINATION
General: AAOx3, NAD  HEENT: NCAT  Cardiac: Normal rate and rhythm, no murmurs, normal peripheral perfusion  Respiratory: Normal rate and effort. CTAB  GI: Soft, nondistended, nontender  Neuro: No focal deficits. LOJA equally x4, sensation to light touch intact throughout  MSK: FROMx4, no focal bony tenderness, no peripheral edema  Skin: No rash

## 2022-12-15 NOTE — ED STATDOCS - NSFOLLOWUPINSTRUCTIONS_ED_ALL_ED_FT
Return to the Emergency Department for worsening or persistent symptoms, and/or ANY NEW OR CONCERNING SYMPTOMS. If you have issues obtaining follow up, please call: [phone] ([phone] or [phone]  to obtain a doctor or specialist who takes your insurance in your area.    Follow up with your GI Dr asap, as discussed    Gastritis, Adult    Outline of an adult's lower body with a close-up of the stomach, showing inflammation and an ulcer inside the stomach.    Gastritis is inflammation of the stomach. There are two kinds of gastritis:  •Acute gastritis. This kind develops suddenly.      •Chronic gastritis. This kind is much more common. It develops slowly and lasts for a long time.      Gastritis happens when the lining of the stomach becomes weak or gets damaged. Without treatment, gastritis can lead to stomach bleeding and ulcers.      What are the causes?    This condition may be caused by:  •An infection.      •Drinking too much alcohol.      •Certain medicines. These include steroids, antibiotics, and some over-the-counter medicines, such as aspirin or ibuprofen.      •Having too much acid in the stomach.      •Having a disease of the stomach.      Other causes may include:  •An allergic reaction.      •Some cancer treatments (radiation).      •Smoking cigarettes or the use of products that contain nicotine or tobacco.      In some cases, the cause of this condition is not known.      What increases the risk?    •Having a disease of the intestines.      •Having a disease in which the body's immune system attacks the body (autoimmune disease), such as Crohn's disease.      •Using aspirin or ibuprofen and other NSAIDs to treat other conditions, such as heart disease or chronic pain.      •Stress.        What are the signs or symptoms?    Symptoms of this condition include:  •Pain or a burning sensation in the upper abdomen.      •Nausea.      •Vomiting.      •An uncomfortable feeling of fullness after eating.      •Weight loss.      •Bad breath.      •Blood in your vomit or stool (feces).      In some cases, there are no symptoms.      How is this diagnosed?    This condition may be diagnosed based on your medical history, a physical exam, and tests. Tests may include:  •Your medical history and a description of your symptoms.      •A physical exam.    •Tests. These can include:  •Blood tests.      •Stool tests.      •A test in which a thin, flexible instrument with a light and a camera is passed down the esophagus and into the stomach (upper endoscopy).      •A test in which a tissue sample is removed to look at it under a microscope (biopsy).          How is this treated?    This condition may be treated with medicines. The medicines that are used vary depending on the cause of the gastritis.  •If the condition is caused by a bacterial infection, you may be given antibiotic medicines.      •If the condition is caused by too much acid in the stomach, you may be given medicines called H2 blockers, proton pump inhibitors, or antacids.      Treatment may also involve stopping the use of certain medicines such as aspirin or ibuprofen and other NSAIDs.      Follow these instructions at home:    Medicines     •Take over-the-counter and prescription medicines only as told by your health care provider.      •If you were prescribed an antibiotic medicine, take it as told by your health care provider. Do not stop taking the antibiotic even if you start to feel better.      Alcohol use   • Do not drink alcohol if:  •Your health care provider tells you not to drink.      •You are pregnant, may be pregnant, or are planning to become pregnant.      •If you drink alcohol:•Limit your use to:  •0–1 drink a day for women.      •0–2 drinks a day for men.        •Know how much alcohol is in your drink. In the [place] one drink equals one 12 oz bottle of beer (355 mL), one 5 oz glass of wine (148 mL), or one 1½ oz glass of hard liquor (44 mL).          General instructions   A comparison of three sample cups showing dark yellow, yellow, and pale yellow urine.   •Eat small, frequent meals instead of large meals.      •Avoid foods and drinks that make your symptoms worse.      •Talk with your health care provider about ways to manage stress, such as getting regular exercise or practicing deep breathing, meditation, or yoga.      • Do not use any products that contain nicotine or tobacco. These products include cigarettes, chewing tobacco, and vaping devices, such as e-cigarettes. If you need help quitting, ask your health care provider.      •Drink enough fluid to keep your urine pale yellow.      •Keep all follow-up visits. This is important.        Contact a health care provider if:    •Your symptoms get worse.      •Your abdominal pain gets worse.      •Your symptoms return after treatment.      •You have a fever.        Get help right away if:    •You vomit blood or a substance that looks like coffee grounds.      •You have black or dark red stools.      •You are unable to keep fluids down.      These symptoms may represent a serious problem that is an emergency. Do not wait to see if the symptoms will go away. Get medical help right away. Call your local emergency services (911 in the [place] Do not drive yourself to the hospital.       Summary    •Gastritis is inflammation of the lining of the stomach that can occur suddenly (acute) or develop slowly over time (chronic).      •This condition is diagnosed with a medical history, a physical exam, or tests.      •This condition may be treated with medicines to treat infection or medicines to reduce the amount of acid in your stomach.      •Follow your health care provider's instructions about taking medicines, making changes to your diet, and knowing when to call for help.      This information is not intended to replace advice given to you by your health care provider. Make sure you discuss any questions you have with your health care provider.

## 2022-12-15 NOTE — ED STATDOCS - PROGRESS NOTE DETAILS
Labs unremarkable, pt feeling better after meds here. Understands needs GI fu for further testing, possible EGD, r/o H pylori. Cont meds as prescribed, diet/lifestyle modifications discussed

## 2022-12-15 NOTE — ED STATDOCS - PATIENT PORTAL LINK FT
You can access the FollowMyHealth Patient Portal offered by [facility] by registering at the following website: [email or web address]. By joining [facility]’s FollowMyHealth portal, you will also be able to view your health information using other applications (apps) compatible with our system.

## 2022-12-16 RX ORDER — FAMOTIDINE 10 MG/ML
1 INJECTION INTRAVENOUS
Qty: 60 | Refills: 0
Start: 2022-12-16 | End: 2023-01-14

## 2022-12-16 RX ORDER — SUCRALFATE 1 G
1 TABLET ORAL
Qty: 56 | Refills: 0
Start: 2022-12-16 | End: 2022-12-29

## 2022-12-16 RX ORDER — PANTOPRAZOLE SODIUM 20 MG/1
1 TABLET, DELAYED RELEASE ORAL
Qty: 30 | Refills: 0
Start: 2022-12-16 | End: 2023-01-14

## 2022-12-22 ENCOUNTER — APPOINTMENT (OUTPATIENT)
Dept: NEUROLOGY | Facility: CLINIC | Age: 52
End: 2022-12-22

## 2022-12-22 PROBLEM — K21.9 GASTRO-ESOPHAGEAL REFLUX DISEASE WITHOUT ESOPHAGITIS: Chronic | Status: ACTIVE | Noted: 2022-12-16

## 2023-02-09 ENCOUNTER — EMERGENCY (EMERGENCY)
Facility: HOSPITAL | Age: 53
LOS: 0 days | Discharge: LEFT BEFORE DISCHARGE | End: 2023-02-09
Payer: COMMERCIAL

## 2023-02-09 VITALS — HEIGHT: 60 IN | WEIGHT: 149.91 LBS

## 2023-02-09 DIAGNOSIS — R10.9 UNSPECIFIED ABDOMINAL PAIN: ICD-10-CM

## 2023-02-09 DIAGNOSIS — Z53.21 PROCEDURE AND TREATMENT NOT CARRIED OUT DUE TO PATIENT LEAVING PRIOR TO BEING SEEN BY HEALTH CARE PROVIDER: ICD-10-CM

## 2023-02-09 PROCEDURE — L9991: CPT

## 2023-02-10 ENCOUNTER — APPOINTMENT (OUTPATIENT)
Dept: GASTROENTEROLOGY | Facility: CLINIC | Age: 53
End: 2023-02-10
Payer: COMMERCIAL

## 2023-02-10 VITALS
HEIGHT: 62 IN | WEIGHT: 151 LBS | DIASTOLIC BLOOD PRESSURE: 79 MMHG | SYSTOLIC BLOOD PRESSURE: 125 MMHG | BODY MASS INDEX: 27.79 KG/M2 | HEART RATE: 72 BPM

## 2023-02-10 DIAGNOSIS — R10.13 EPIGASTRIC PAIN: ICD-10-CM

## 2023-02-10 PROCEDURE — 99213 OFFICE O/P EST LOW 20 MIN: CPT

## 2023-02-10 NOTE — ASSESSMENT
[FreeTextEntry1] : 51 yo female with history of recurrent epigastric discomfort.  Obtain abdominal sonogram to rule out gallstones.  Patient to take daily pantoprazole.  Follow-up one month.

## 2023-02-10 NOTE — PHYSICAL EXAM

## 2023-02-10 NOTE — HISTORY OF PRESENT ILLNESS
[FreeTextEntry1] : Ms. MARIYA VALDIVIA is a 52 year old female with history of recurrent episodes of epigastric discomfort.  Patient went to the emergency room where she was given pantoprazole and sucralfate which she took for 3 weeks with improvement.  She stopped the medications and her symptoms came back.  She went back to the emergency room but did not stay for evaluation.  The pain is her reported as epigastric and occasionally radiates to the back.  Patient had recent evaluation including upper endoscopy that was unremarkable.  No use of nonsteroidal medications.\par

## 2023-02-12 ENCOUNTER — OUTPATIENT (OUTPATIENT)
Dept: OUTPATIENT SERVICES | Facility: HOSPITAL | Age: 53
LOS: 1 days | End: 2023-02-12
Payer: COMMERCIAL

## 2023-02-12 ENCOUNTER — APPOINTMENT (OUTPATIENT)
Dept: ULTRASOUND IMAGING | Facility: CLINIC | Age: 53
End: 2023-02-12
Payer: COMMERCIAL

## 2023-02-12 DIAGNOSIS — R51.9 HEADACHE, UNSPECIFIED: ICD-10-CM

## 2023-02-12 DIAGNOSIS — R10.13 EPIGASTRIC PAIN: ICD-10-CM

## 2023-02-12 DIAGNOSIS — Z00.8 ENCOUNTER FOR OTHER GENERAL EXAMINATION: ICD-10-CM

## 2023-02-12 PROCEDURE — 76700 US EXAM ABDOM COMPLETE: CPT

## 2023-02-12 PROCEDURE — 76700 US EXAM ABDOM COMPLETE: CPT | Mod: 26

## 2023-02-16 ENCOUNTER — APPOINTMENT (OUTPATIENT)
Dept: NEUROLOGY | Facility: CLINIC | Age: 53
End: 2023-02-16
Payer: COMMERCIAL

## 2023-02-16 DIAGNOSIS — G56.21 LESION OF ULNAR NERVE, RIGHT UPPER LIMB: ICD-10-CM

## 2023-02-16 PROCEDURE — 95886 MUSC TEST DONE W/N TEST COMP: CPT

## 2023-02-16 PROCEDURE — 99213 OFFICE O/P EST LOW 20 MIN: CPT | Mod: 25

## 2023-02-16 PROCEDURE — 99072 ADDL SUPL MATRL&STAF TM PHE: CPT

## 2023-02-16 PROCEDURE — 95910 NRV CNDJ TEST 7-8 STUDIES: CPT

## 2023-02-16 NOTE — PROCEDURE
[FreeTextEntry1] : EMG/NCV studies of upper extremities.\par \par The electrodiagnostic findings are consistent with see 6/7 radiculopathy on the right side, likely chronic.\par \par There is no electrodiagnostic evidence of median neuropathy at the wrist or ulnar neuropathy at/across elbow segments on either side.

## 2023-02-16 NOTE — DATA REVIEWED
[de-identified] : 12/10/2021: MRI brain and IACs WAW contrast: Normal MRI of the brain.  No cerebral pontine angle or internal auditory canal mass [de-identified] : 8/18/2022: MRI cervical spine: Posterior disc osteophyte complex contributes to mild to moderate spinal canal stenosis at C5-6 and C6-7.  No high-grade cervical spinal canal or neural canal stenosis

## 2023-02-16 NOTE — HISTORY OF PRESENT ILLNESS
[FreeTextEntry1] : Patient is here for a follow-up visit today, last seen on 11/2/2022.  She has been attending vestibular therapy, she has noted remarkable reduction in the dizziness, she reports she has stopped the exercises.\par \par Patient continues to have hypersensitivity in the right ulnar groove, occasionally radiates to right pinky finger, in addition she also reports neck pain and discomfort and occasional radiation of neck pain to the proximal right arm.
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
## 2023-02-16 NOTE — REASON FOR VISIT
[Follow-Up: _____] : a [unfilled] follow-up visit [FreeTextEntry1] : for dizziness, neck pain radiating to the arm and for EMGs/NCV studies of upper extremities to evaluate for ulnar neuropathy

## 2023-02-16 NOTE — REVIEW OF SYSTEMS
[Numbness] : numbness [Tingling] : tingling [Dizziness] : dizziness [As Noted in HPI] : as noted in HPI [Joint Pain] : joint pain [Negative] : Heme/Lymph [Vertigo] : no vertigo [Tension Headache] : no tension-type headache [FreeTextEntry9] : Neck pain

## 2023-03-30 ENCOUNTER — NON-APPOINTMENT (OUTPATIENT)
Age: 53
End: 2023-03-30

## 2023-03-31 ENCOUNTER — APPOINTMENT (OUTPATIENT)
Dept: GASTROENTEROLOGY | Facility: CLINIC | Age: 53
End: 2023-03-31
Payer: COMMERCIAL

## 2023-03-31 VITALS
BODY MASS INDEX: 28.34 KG/M2 | DIASTOLIC BLOOD PRESSURE: 82 MMHG | WEIGHT: 154 LBS | SYSTOLIC BLOOD PRESSURE: 155 MMHG | HEIGHT: 62 IN | HEART RATE: 69 BPM

## 2023-03-31 PROCEDURE — 99213 OFFICE O/P EST LOW 20 MIN: CPT

## 2023-03-31 NOTE — HISTORY OF PRESENT ILLNESS
[FreeTextEntry1] : Ms. MARIYA VALDIVIA is a 53 year old female with history of GERD symptoms and epigastric discomfort.  Patient was started on pantoprazole and has noted marked improvement in symptoms.  There were several days and patient did not have medication and symptoms recurred.  There is no dysphagia or weight loss.  Abdominal sonogram done after last visit was unremarkable.  Lifestyle modifications reviewed with patient.\par

## 2023-03-31 NOTE — ASSESSMENT
[FreeTextEntry1] : 52 yo female with history of resolved GERD symptoms on pantoprazole.  Patient advised to continue lifestyle modifications and continue daily pantoprazole.  Follow-up three months and changed to as needed at that time.

## 2023-03-31 NOTE — PHYSICAL EXAM

## 2023-04-17 ENCOUNTER — APPOINTMENT (OUTPATIENT)
Dept: INTERNAL MEDICINE | Facility: CLINIC | Age: 53
End: 2023-04-17
Payer: COMMERCIAL

## 2023-04-17 VITALS
HEART RATE: 91 BPM | BODY MASS INDEX: 28.52 KG/M2 | TEMPERATURE: 97.8 F | DIASTOLIC BLOOD PRESSURE: 82 MMHG | WEIGHT: 155 LBS | OXYGEN SATURATION: 97 % | HEIGHT: 62 IN | SYSTOLIC BLOOD PRESSURE: 134 MMHG

## 2023-04-17 DIAGNOSIS — Z00.00 ENCOUNTER FOR GENERAL ADULT MEDICAL EXAMINATION W/OUT ABNORMAL FINDINGS: ICD-10-CM

## 2023-04-17 DIAGNOSIS — R61 GENERALIZED HYPERHIDROSIS: ICD-10-CM

## 2023-04-17 DIAGNOSIS — R51.9 HEADACHE, UNSPECIFIED: ICD-10-CM

## 2023-04-17 PROCEDURE — 99396 PREV VISIT EST AGE 40-64: CPT

## 2023-04-17 RX ORDER — IBUPROFEN 400 MG
400 TABLET ORAL
Refills: 0 | Status: DISCONTINUED | COMMUNITY
End: 2023-04-17

## 2023-04-17 NOTE — HEALTH RISK ASSESSMENT
[Yes] : Yes [2 - 4 times a month (2 pts)] : 2-4 times a month (2 points) [3 or 4 (1 pt)] : 3 or 4  (1 point) [Never (0 pts)] : Never (0 points) [No] : In the past 12 months have you used drugs other than those required for medical reasons? No [Former] : Former [5-9] : 5-9 [< 15 Years] : < 15 Years [Patient reported mammogram was normal] : Patient reported mammogram was normal [Patient reported PAP Smear was normal] : Patient reported PAP Smear was normal [Patient reported colonoscopy was normal] : Patient reported colonoscopy was normal [Good] : ~his/her~  mood as  good [Audit-CScore] : 3 [MammogramDate] : 04/22 [PapSmearDate] : 04/22 [ColonoscopyDate] : 04/21

## 2023-04-17 NOTE — HISTORY OF PRESENT ILLNESS
[FreeTextEntry1] : Patient is here for CPE [de-identified] : Ms. Nash presents for annual physical examination.\par She is complaining of worsening symptoms of vertigo and lightheadedness for the past 2 to 3 weeks.  The symptoms become significantly more prominent when she turns her head, especially to the right.  Patient is also experiencing sweats at night.  This is associated with body tremors.\par Ms. Nash has also been experiencing "headaches".  She states that it is more like a fullness in her head that she feels at night.  It is been associated with "brain fog".\par She does get associated episodes of nausea.
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
## 2023-04-21 LAB
ALBUMIN SERPL ELPH-MCNC: 4.7 G/DL
ALP BLD-CCNC: 68 U/L
ALT SERPL-CCNC: 51 U/L
ANION GAP SERPL CALC-SCNC: 13 MMOL/L
AST SERPL-CCNC: 36 U/L
BASOPHILS # BLD AUTO: 0.06 K/UL
BASOPHILS NFR BLD AUTO: 0.9 %
BILIRUB SERPL-MCNC: 0.6 MG/DL
BUN SERPL-MCNC: 19 MG/DL
CALCIUM SERPL-MCNC: 9.7 MG/DL
CHLORIDE SERPL-SCNC: 102 MMOL/L
CHOLEST SERPL-MCNC: 226 MG/DL
CO2 SERPL-SCNC: 26 MMOL/L
CREAT SERPL-MCNC: 0.94 MG/DL
EGFR: 73 ML/MIN/1.73M2
EOSINOPHIL # BLD AUTO: 0.3 K/UL
EOSINOPHIL NFR BLD AUTO: 4.7 %
ESTIMATED AVERAGE GLUCOSE: 111 MG/DL
GLUCOSE SERPL-MCNC: 94 MG/DL
HBA1C MFR BLD HPLC: 5.5 %
HCT VFR BLD CALC: 43.8 %
HDLC SERPL-MCNC: 67 MG/DL
HGB BLD-MCNC: 13.9 G/DL
IMM GRANULOCYTES NFR BLD AUTO: 0.2 %
IRON SERPL-MCNC: 130 UG/DL
LDLC SERPL CALC-MCNC: 134 MG/DL
LYMPHOCYTES # BLD AUTO: 2.11 K/UL
LYMPHOCYTES NFR BLD AUTO: 33 %
MAN DIFF?: NORMAL
MCHC RBC-ENTMCNC: 30 PG
MCHC RBC-ENTMCNC: 31.7 GM/DL
MCV RBC AUTO: 94.4 FL
MONOCYTES # BLD AUTO: 0.56 K/UL
MONOCYTES NFR BLD AUTO: 8.8 %
NEUTROPHILS # BLD AUTO: 3.35 K/UL
NEUTROPHILS NFR BLD AUTO: 52.4 %
NONHDLC SERPL-MCNC: 159 MG/DL
PLATELET # BLD AUTO: 314 K/UL
POTASSIUM SERPL-SCNC: 4.5 MMOL/L
PROT SERPL-MCNC: 7.1 G/DL
RBC # BLD: 4.64 M/UL
RBC # FLD: 13.4 %
SODIUM SERPL-SCNC: 141 MMOL/L
TRIGL SERPL-MCNC: 122 MG/DL
TSH SERPL-ACNC: 1.56 UIU/ML
WBC # FLD AUTO: 6.39 K/UL

## 2023-04-24 LAB
APPEARANCE: CLEAR
BACTERIA: NEGATIVE /HPF
BILIRUBIN URINE: NEGATIVE
BLOOD URINE: NEGATIVE
CAST: 0 /LPF
COLOR: YELLOW
EPITHELIAL CELLS: 1 /HPF
GLUCOSE QUALITATIVE U: NEGATIVE MG/DL
KETONES URINE: NEGATIVE MG/DL
LEUKOCYTE ESTERASE URINE: NEGATIVE
MICROSCOPIC-UA: NORMAL
NITRITE URINE: NEGATIVE
PH URINE: 5.5
PROTEIN URINE: NEGATIVE MG/DL
RED BLOOD CELLS URINE: 0 /HPF
SPECIFIC GRAVITY URINE: 1.03
UROBILINOGEN URINE: 0.2 MG/DL
WHITE BLOOD CELLS URINE: 0 /HPF

## 2023-05-02 ENCOUNTER — NON-APPOINTMENT (OUTPATIENT)
Age: 53
End: 2023-05-02

## 2023-05-02 LAB
ALBUMIN SERPL ELPH-MCNC: 4.8 G/DL
ALP BLD-CCNC: 60 U/L
ALT SERPL-CCNC: 30 U/L
AST SERPL-CCNC: 25 U/L
BILIRUB DIRECT SERPL-MCNC: 0.2 MG/DL
BILIRUB INDIRECT SERPL-MCNC: 0.6 MG/DL
BILIRUB SERPL-MCNC: 0.7 MG/DL
HAV IGM SER QL: NONREACTIVE
HBV CORE IGM SER QL: NONREACTIVE
HBV SURFACE AG SER QL: NONREACTIVE
HCV AB SER QL: NONREACTIVE
HCV S/CO RATIO: 0.1 S/CO
HETEROPH AB SER QL: NEGATIVE
PROT SERPL-MCNC: 7.2 G/DL

## 2023-05-03 ENCOUNTER — APPOINTMENT (OUTPATIENT)
Dept: NEUROLOGY | Facility: CLINIC | Age: 53
End: 2023-05-03
Payer: COMMERCIAL

## 2023-05-03 VITALS
HEIGHT: 62 IN | BODY MASS INDEX: 28.34 KG/M2 | SYSTOLIC BLOOD PRESSURE: 136 MMHG | WEIGHT: 154 LBS | TEMPERATURE: 97.6 F | DIASTOLIC BLOOD PRESSURE: 90 MMHG | HEART RATE: 85 BPM

## 2023-05-03 DIAGNOSIS — M54.12 RADICULOPATHY, CERVICAL REGION: ICD-10-CM

## 2023-05-03 PROCEDURE — 99214 OFFICE O/P EST MOD 30 MIN: CPT

## 2023-05-03 RX ORDER — ESTRADIOL AND NORETHINDRONE ACETATE 1; .5 MG/1; MG/1
TABLET, FILM COATED ORAL
Refills: 0 | Status: DISCONTINUED | COMMUNITY
End: 2023-05-03
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
## 2023-05-03 NOTE — HISTORY OF PRESENT ILLNESS
[FreeTextEntry1] : Patient is here for a follow-up visit today, last seen on 2/16/23.  Pt reports since past next weeks, she has noted worsening vertigo, she has been experiencing dizziness with any head and neck movements, intermittently associated with spinning sensation, she also reports headache and pressure-like feeling on top of the head, she has gone back for vestibular therapy, they performed Epley maneuver, induced nystagmus, she has attended 4 sessions so far and has noted improvement, and is very distressed as she has been having what her  describes as panic-like attacks in anticipation of vertigo attacks.\par \par Patient continues to have neck stiffness and pain, neck movements triggered the pain, she reports crackling in the neck, she denies neck pain radiating into the arms

## 2023-05-03 NOTE — DATA REVIEWED
[de-identified] : 12/10/2021: MRI brain and IACs WAW contrast: Normal MRI of the brain.  No cerebral pontine angle or internal auditory canal mass [de-identified] : 2/16/23: EMG/NCV studies of upper extremities. The electrodiagnostic findings are consistent with C6/7 radiculopathy on the right side, likely chronic. There is no electrodiagnostic evidence of median neuropathy at the wrist or ulnar neuropathy at/across elbow segments on either side.\par 8/18/2022: MRI cervical spine: Posterior disc osteophyte complex contributes to mild to moderate spinal canal stenosis at C5-6 and C6-7.  No high-grade cervical spinal canal or neural canal stenosis

## 2023-05-03 NOTE — REVIEW OF SYSTEMS
[Dizziness] : dizziness [As Noted in HPI] : as noted in HPI [Joint Pain] : joint pain [Negative] : Heme/Lymph [Vertigo] : vertigo [Tension Headache] : tension-type headaches [Numbness] : no numbness [Tingling] : no tingling [FreeTextEntry9] : Neck pain

## 2023-05-03 NOTE — PHYSICAL EXAM
[General Appearance - Alert] : alert [General Appearance - In No Acute Distress] : in no acute distress [Oriented To Time, Place, And Person] : oriented to person, place, and time [Impaired Insight] : insight and judgment were intact [Affect] : the affect was normal [Person] : oriented to person [Place] : oriented to place [Time] : oriented to time [Registration Intact] : recent registration memory intact [Concentration Intact] : normal concentrating ability [Naming Objects] : no difficulty naming common objects [Repeating Phrases] : no difficulty repeating a phrase [Fluency] : fluency intact [Comprehension] : comprehension intact [Past History] : adequate knowledge of personal past history [Cranial Nerves Optic (II)] : visual acuity intact bilaterally,  visual fields full to confrontation, pupils equal round and reactive to light [Cranial Nerves Oculomotor (III)] : extraocular motion intact [Cranial Nerves Trigeminal (V)] : facial sensation intact symmetrically [Cranial Nerves Facial (VII)] : face symmetrical [Cranial Nerves Vestibulocochlear (VIII)] : hearing was intact bilaterally [Cranial Nerves Glossopharyngeal (IX)] : tongue and palate midline [Cranial Nerves Accessory (XI - Cranial And Spinal)] : head turning and shoulder shrug symmetric [Cranial Nerves Hypoglossal (XII)] : there was no tongue deviation with protrusion [Motor Tone] : muscle tone was normal in all four extremities [No Muscle Atrophy] : normal bulk in all four extremities [Motor Strength] : muscle strength was normal in all four extremities [Sensation Tactile Decrease] : light touch was intact [Pain / Temp Decrease Hand Ulnar 1-1/2 Digits] : diminished right ulnar 1-1/2 digits [Abnormal Walk] : normal gait [2+] : Patella left 2+ [1+] : Ankle jerk left 1+ [PERRL With Normal Accommodation] : pupils were equal in size, round, reactive to light, with normal accommodation [Full Visual Field] : full visual field [Extraocular Movements] : extraocular movements were intact [Outer Ear] : the ears and nose were normal in appearance [Hearing Threshold Finger Rub Not [place]] : hearing was normal [Neck Cervical Mass (___cm)] : no neck mass was observed [] : no respiratory distress [Auscultation Breath Sounds / Voice Sounds] : lungs were clear to auscultation bilaterally [Heart Rate And Rhythm] : heart rate was normal and rhythm regular [Heart Sounds] : normal S1 and S2 [Involuntary Movements] : no involuntary movements were seen [Past-pointing] : there was no past-pointing [Tremor] : no tremor present [Plantar Reflex Right Only] : normal on the right [Coordination - Dysmetria Impaired Finger-to-Nose Bilateral] : not present [Plantar Reflex Left Only] : normal on the left [FreeTextEntry1] : Neck ROM: Lateral neck movements slightly restricted, evoke dizziness

## 2023-05-03 NOTE — REASON FOR VISIT
[Follow-Up: _____] : a [unfilled] follow-up visit [Spouse] : spouse [FreeTextEntry1] : for vertigo, HA, neck pain, cervical radiculopathy

## 2023-07-06 ENCOUNTER — APPOINTMENT (OUTPATIENT)
Dept: GASTROENTEROLOGY | Facility: CLINIC | Age: 53
End: 2023-07-06
Payer: COMMERCIAL

## 2023-07-06 VITALS
BODY MASS INDEX: 28.52 KG/M2 | HEIGHT: 62 IN | DIASTOLIC BLOOD PRESSURE: 78 MMHG | SYSTOLIC BLOOD PRESSURE: 138 MMHG | HEART RATE: 73 BPM | WEIGHT: 155 LBS

## 2023-07-06 DIAGNOSIS — K21.9 GASTRO-ESOPHAGEAL REFLUX DISEASE W/OUT ESOPHAGITIS: ICD-10-CM

## 2023-07-06 PROCEDURE — 99213 OFFICE O/P EST LOW 20 MIN: CPT

## 2023-07-06 NOTE — PHYSICAL EXAM

## 2023-07-06 NOTE — ASSESSMENT
[FreeTextEntry1] : 54 yo female with history of well controlled symptoms.  We will initiate nightly famotidine.  Patient continue lifestyle modifications.  Patient to call back as needed if symptoms do not respond to therapy.

## 2023-07-06 NOTE — HISTORY OF PRESENT ILLNESS
[FreeTextEntry1] : Ms. MARIYA VALDIVIA is a 53 year old female with history of symptoms.  Patient had initial excellent response to the use of pantoprazole daily.  Patient did develop vertigo and was concerned that this may be contributing factor.  She stopped the medication and changed her diet.  Her GERD symptoms been under good control.  Patient does note occasional globus symptoms.\par

## 2023-08-11 ENCOUNTER — APPOINTMENT (OUTPATIENT)
Dept: CARDIOLOGY | Facility: CLINIC | Age: 53
End: 2023-08-11
Payer: COMMERCIAL

## 2023-08-11 VITALS
SYSTOLIC BLOOD PRESSURE: 132 MMHG | HEART RATE: 83 BPM | DIASTOLIC BLOOD PRESSURE: 86 MMHG | WEIGHT: 158 LBS | HEIGHT: 62 IN | BODY MASS INDEX: 29.08 KG/M2 | OXYGEN SATURATION: 99 %

## 2023-08-11 DIAGNOSIS — R42 DIZZINESS AND GIDDINESS: ICD-10-CM

## 2023-08-11 DIAGNOSIS — J45.909 UNSPECIFIED ASTHMA, UNCOMPLICATED: ICD-10-CM

## 2023-08-11 PROCEDURE — 93000 ELECTROCARDIOGRAM COMPLETE: CPT

## 2023-08-11 PROCEDURE — 99204 OFFICE O/P NEW MOD 45 MIN: CPT | Mod: 25

## 2023-08-11 RX ORDER — MECLIZINE HYDROCHLORIDE 25 MG/1
25 TABLET ORAL EVERY 6 HOURS
Qty: 60 | Refills: 3 | Status: DISCONTINUED | COMMUNITY
Start: 2023-04-17 | End: 2023-08-11

## 2023-08-11 RX ORDER — PANTOPRAZOLE 40 MG/1
40 TABLET, DELAYED RELEASE ORAL
Qty: 30 | Refills: 11 | Status: DISCONTINUED | COMMUNITY
End: 2023-08-11

## 2023-08-11 RX ORDER — CYCLOBENZAPRINE HYDROCHLORIDE 5 MG/1
5 TABLET, FILM COATED ORAL
Qty: 30 | Refills: 0 | Status: DISCONTINUED | COMMUNITY
Start: 2023-05-03 | End: 2023-08-11

## 2023-08-11 RX ORDER — LIDOCAINE 5% 700 MG/1
5 PATCH TOPICAL
Qty: 1 | Refills: 1 | Status: DISCONTINUED | COMMUNITY
Start: 2023-05-03 | End: 2023-08-11

## 2023-08-11 RX ORDER — ESTRADIOL AND NORETHINDRONE ACETATE 1; .5 MG/1; MG/1
TABLET, FILM COATED ORAL DAILY
Refills: 0 | Status: ACTIVE | COMMUNITY

## 2023-08-11 RX ORDER — FAMOTIDINE 40 MG/1
40 TABLET, FILM COATED ORAL
Qty: 90 | Refills: 3 | Status: DISCONTINUED | COMMUNITY
Start: 2023-07-06 | End: 2023-08-11

## 2023-08-11 RX ORDER — ALBUTEROL SULFATE 90 UG/1
108 (90 BASE) INHALANT RESPIRATORY (INHALATION)
Qty: 1 | Refills: 0 | Status: DISCONTINUED | COMMUNITY
Start: 2022-10-19 | End: 2023-08-11

## 2023-08-11 NOTE — HISTORY OF PRESENT ILLNESS
[FreeTextEntry1] : 54 yo female presents with complaints of shortness of breath which began 4 months ago. She has exertional symptoms. Symptoms are intermittent and remaining the same intensity. She reports palpitations and accelerated heart rate when exercising. She reports pain of her chest. Deep breathing and relaxing improves symptoms. 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

## 2023-08-11 NOTE — DISCUSSION/SUMMARY
[Stable] : stable [Patient] : the patient [EKG obtained to assist in diagnosis and management of assessed problem(s)] : EKG obtained to assist in diagnosis and management of assessed problem(s) [FreeTextEntry1] : Zio and Echo, Stress test to evaluate symptoms and exercise tolerance. Pt will wear ZIO for 1 week.

## 2023-08-11 NOTE — PHYSICAL EXAM
[Well Developed] : well developed [Well Nourished] : well nourished [No Acute Distress] : no acute distress [Normal Conjunctiva] : normal conjunctiva [Normal Venous Pressure] : normal venous pressure [No Carotid Bruit] : no carotid bruit [Normal S1, S2] : normal S1, S2 [No Murmur] : no murmur [No Rub] : no rub [No Gallop] : no gallop [Clear Lung Fields] : clear lung fields [Good Air Entry] : good air entry [No Respiratory Distress] : no respiratory distress  [Soft] : abdomen soft [Non Tender] : non-tender [No Masses/organomegaly] : no masses/organomegaly [Normal Bowel Sounds] : normal bowel sounds [No Edema] : no edema [Normal Gait] : normal gait [No Cyanosis] : no cyanosis [No Clubbing] : no clubbing [No Varicosities] : no varicosities [No Rash] : no rash [Moves all extremities] : moves all extremities [No Skin Lesions] : no skin lesions [No Focal Deficits] : no focal deficits [Normal Speech] : normal speech [Alert and Oriented] : alert and oriented [Normal memory] : normal memory

## 2023-08-28 ENCOUNTER — APPOINTMENT (OUTPATIENT)
Dept: CARDIOLOGY | Facility: CLINIC | Age: 53
End: 2023-08-28
Payer: COMMERCIAL

## 2023-08-28 PROCEDURE — 93306 TTE W/DOPPLER COMPLETE: CPT

## 2023-09-06 ENCOUNTER — APPOINTMENT (OUTPATIENT)
Dept: CARDIOLOGY | Facility: CLINIC | Age: 53
End: 2023-09-06
Payer: COMMERCIAL

## 2023-09-06 PROCEDURE — 93015 CV STRESS TEST SUPVJ I&R: CPT

## 2023-09-21 ENCOUNTER — APPOINTMENT (OUTPATIENT)
Dept: CARDIOLOGY | Facility: CLINIC | Age: 53
End: 2023-09-21
Payer: COMMERCIAL

## 2023-09-21 VITALS
OXYGEN SATURATION: 99 % | BODY MASS INDEX: 29.26 KG/M2 | WEIGHT: 159 LBS | DIASTOLIC BLOOD PRESSURE: 70 MMHG | SYSTOLIC BLOOD PRESSURE: 102 MMHG | HEART RATE: 72 BPM | HEIGHT: 62 IN

## 2023-09-21 PROCEDURE — 99214 OFFICE O/P EST MOD 30 MIN: CPT | Mod: 25

## 2023-09-21 PROCEDURE — 93242 EXT ECG>48HR<7D RECORDING: CPT

## 2023-09-21 PROCEDURE — 93000 ELECTROCARDIOGRAM COMPLETE: CPT | Mod: 59

## 2023-10-17 ENCOUNTER — APPOINTMENT (OUTPATIENT)
Dept: NEUROLOGY | Facility: CLINIC | Age: 53
End: 2023-10-17
Payer: COMMERCIAL

## 2023-10-17 VITALS
WEIGHT: 160 LBS | HEART RATE: 80 BPM | HEIGHT: 62 IN | SYSTOLIC BLOOD PRESSURE: 124 MMHG | TEMPERATURE: 98.2 F | DIASTOLIC BLOOD PRESSURE: 77 MMHG | BODY MASS INDEX: 29.44 KG/M2

## 2023-10-17 DIAGNOSIS — S16.1XXA STRAIN OF MUSCLE, FASCIA AND TENDON AT NECK LEVEL, INITIAL ENCOUNTER: ICD-10-CM

## 2023-10-17 DIAGNOSIS — G44.209 TENSION-TYPE HEADACHE, UNSPECIFIED, NOT INTRACTABLE: ICD-10-CM

## 2023-10-17 PROCEDURE — 99214 OFFICE O/P EST MOD 30 MIN: CPT

## 2023-10-17 RX ORDER — LIDOCAINE 5% 700 MG/1
5 PATCH TOPICAL
Qty: 1 | Refills: 0 | Status: ACTIVE | COMMUNITY
Start: 2023-10-17 | End: 1900-01-01

## 2024-05-21 ENCOUNTER — APPOINTMENT (OUTPATIENT)
Dept: NEUROLOGY | Facility: CLINIC | Age: 54
End: 2024-05-21
Payer: COMMERCIAL

## 2024-05-21 VITALS
SYSTOLIC BLOOD PRESSURE: 118 MMHG | HEIGHT: 62 IN | DIASTOLIC BLOOD PRESSURE: 76 MMHG | TEMPERATURE: 97.8 F | BODY MASS INDEX: 29.44 KG/M2 | WEIGHT: 160 LBS | HEART RATE: 77 BPM

## 2024-05-21 DIAGNOSIS — M54.2 CERVICALGIA: ICD-10-CM

## 2024-05-21 DIAGNOSIS — G43.109 MIGRAINE WITH AURA, NOT INTRACTABLE, W/OUT STATUS MIGRAINOSUS: ICD-10-CM

## 2024-05-21 DIAGNOSIS — G89.29 CERVICALGIA: ICD-10-CM

## 2024-05-21 DIAGNOSIS — R42 DIZZINESS AND GIDDINESS: ICD-10-CM

## 2024-05-21 PROCEDURE — 99214 OFFICE O/P EST MOD 30 MIN: CPT

## 2024-05-21 RX ORDER — MECLIZINE HYDROCHLORIDE 25 MG/1
25 TABLET ORAL DAILY
Refills: 0 | Status: ACTIVE | COMMUNITY

## 2024-05-21 RX ORDER — SERTRALINE 25 MG/1
25 TABLET, FILM COATED ORAL
Qty: 60 | Refills: 1 | Status: ACTIVE | COMMUNITY
Start: 2024-05-21 | End: 1900-01-01

## 2024-05-21 NOTE — PHYSICAL EXAM
[General Appearance - Alert] : alert [General Appearance - In No Acute Distress] : in no acute distress [Oriented To Time, Place, And Person] : oriented to person, place, and time [Impaired Insight] : insight and judgment were intact [Affect] : the affect was normal [Person] : oriented to person [Place] : oriented to place [Time] : oriented to time [Registration Intact] : recent registration memory intact [Concentration Intact] : normal concentrating ability [Naming Objects] : no difficulty naming common objects [Repeating Phrases] : no difficulty repeating a phrase [Fluency] : fluency intact [Comprehension] : comprehension intact [Past History] : adequate knowledge of personal past history [Cranial Nerves Optic (II)] : visual acuity intact bilaterally,  visual fields full to confrontation, pupils equal round and reactive to light [Cranial Nerves Oculomotor (III)] : extraocular motion intact [Cranial Nerves Trigeminal (V)] : facial sensation intact symmetrically [Cranial Nerves Facial (VII)] : face symmetrical [Cranial Nerves Vestibulocochlear (VIII)] : hearing was intact bilaterally [Cranial Nerves Glossopharyngeal (IX)] : tongue and palate midline [Cranial Nerves Accessory (XI - Cranial And Spinal)] : head turning and shoulder shrug symmetric [Cranial Nerves Hypoglossal (XII)] : there was no tongue deviation with protrusion [Motor Tone] : muscle tone was normal in all four extremities [Motor Strength] : muscle strength was normal in all four extremities [No Muscle Atrophy] : normal bulk in all four extremities [Sensation Tactile Decrease] : light touch was intact [Pain / Temp Decrease Hand Ulnar 1-1/2 Digits] : diminished right ulnar 1-1/2 digits [Abnormal Walk] : normal gait [2+] : Patella left 2+ [1+] : Ankle jerk left 1+ [PERRL With Normal Accommodation] : pupils were equal in size, round, reactive to light, with normal accommodation [Extraocular Movements] : extraocular movements were intact [Full Visual Field] : full visual field [Outer Ear] : the ears and nose were normal in appearance [Hearing Threshold Finger Rub Not [place]] : hearing was normal [Neck Cervical Mass (___cm)] : no neck mass was observed [Past-pointing] : there was no past-pointing [Tremor] : no tremor present [Coordination - Dysmetria Impaired Finger-to-Nose Bilateral] : not present [Plantar Reflex Right Only] : normal on the right [Plantar Reflex Left Only] : normal on the left [FreeTextEntry8] : Able to walk tandem [FreeTextEntry1] : Neck ROM: Lateral neck movements slightly restricted, evoke dizziness

## 2024-05-21 NOTE — REVIEW OF SYSTEMS
[Vertigo] : vertigo [Tension Headache] : tension-type headaches [As Noted in HPI] : as noted in HPI [Joint Pain] : joint pain [Negative] : Heme/Lymph [Numbness] : no numbness [Dizziness] : no dizziness [Migraine Headache] : migraine headaches [FreeTextEntry9] : Neck pain
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
## 2024-05-21 NOTE — REASON FOR VISIT
[Follow-Up: _____] : a [unfilled] follow-up visit [Spouse] : spouse [FreeTextEntry1] : for vertigo, HA, cervical radiculopathy

## 2024-05-21 NOTE — HISTORY OF PRESENT ILLNESS
[FreeTextEntry1] : Patient is here for a follow-up visit today, last seen on 11/17/23. Pt Has been attending vestibular therapy at [facility], she reports she has made 50% improvement, she was also prescribed combination of co-Q10, B2 and magnesium supplements, this has also provided some relief.  Patient reports she has [facility], was advised SSRI which she declined.  Patient continues to have pressure-like feeling in the head, she also complains of intermittent headaches not always associated with vertigo symptoms, she has neck stiffness, which has improved remarkably with therapy  Patient is less anxious and is not panicking any longer, but she does admit that every now and then she gets rattled,  is accompanying her today and is encouraging her to start SSRI

## 2024-07-03 ENCOUNTER — APPOINTMENT (OUTPATIENT)
Dept: INTERNAL MEDICINE | Facility: CLINIC | Age: 54
End: 2024-07-03
Payer: COMMERCIAL

## 2024-07-03 VITALS
BODY MASS INDEX: 29.26 KG/M2 | DIASTOLIC BLOOD PRESSURE: 84 MMHG | TEMPERATURE: 99.2 F | OXYGEN SATURATION: 97 % | RESPIRATION RATE: 16 BRPM | HEART RATE: 87 BPM | SYSTOLIC BLOOD PRESSURE: 130 MMHG | HEIGHT: 62 IN | WEIGHT: 159 LBS

## 2024-07-03 DIAGNOSIS — Z87.898 PERSONAL HISTORY OF OTHER SPECIFIED CONDITIONS: ICD-10-CM

## 2024-07-03 DIAGNOSIS — R42 DIZZINESS AND GIDDINESS: ICD-10-CM

## 2024-07-03 DIAGNOSIS — M25.551 PAIN IN RIGHT HIP: ICD-10-CM

## 2024-07-03 DIAGNOSIS — S16.1XXA STRAIN OF MUSCLE, FASCIA AND TENDON AT NECK LEVEL, INITIAL ENCOUNTER: ICD-10-CM

## 2024-07-03 DIAGNOSIS — Z12.11 ENCOUNTER FOR SCREENING FOR MALIGNANT NEOPLASM OF COLON: ICD-10-CM

## 2024-07-03 DIAGNOSIS — Z00.00 ENCOUNTER FOR GENERAL ADULT MEDICAL EXAMINATION W/OUT ABNORMAL FINDINGS: ICD-10-CM

## 2024-07-03 DIAGNOSIS — M25.561 PAIN IN RIGHT HIP: ICD-10-CM

## 2024-07-03 DIAGNOSIS — S46.011A STRAIN OF MUSCLE(S) AND TENDON(S) OF THE ROTATOR CUFF OF RIGHT SHOULDER, INITIAL ENCOUNTER: ICD-10-CM

## 2024-07-03 DIAGNOSIS — Z87.828 PERSONAL HISTORY OF OTHER (HEALED) PHYSICAL INJURY AND TRAUMA: ICD-10-CM

## 2024-07-03 DIAGNOSIS — R79.89 OTHER SPECIFIED ABNORMAL FINDINGS OF BLOOD CHEMISTRY: ICD-10-CM

## 2024-07-03 DIAGNOSIS — M25.562 PAIN IN RIGHT HIP: ICD-10-CM

## 2024-07-03 DIAGNOSIS — F07.81 POSTCONCUSSIONAL SYNDROME: ICD-10-CM

## 2024-07-03 DIAGNOSIS — G89.29 PAIN IN RIGHT SHOULDER: ICD-10-CM

## 2024-07-03 DIAGNOSIS — M79.671 PAIN IN RIGHT FOOT: ICD-10-CM

## 2024-07-03 DIAGNOSIS — G89.29 PAIN IN RIGHT HIP: ICD-10-CM

## 2024-07-03 DIAGNOSIS — M54.2 CERVICALGIA: ICD-10-CM

## 2024-07-03 DIAGNOSIS — K21.9 GASTRO-ESOPHAGEAL REFLUX DISEASE W/OUT ESOPHAGITIS: ICD-10-CM

## 2024-07-03 DIAGNOSIS — M25.511 PAIN IN RIGHT SHOULDER: ICD-10-CM

## 2024-07-03 DIAGNOSIS — M79.672 PAIN IN RIGHT FOOT: ICD-10-CM

## 2024-07-03 DIAGNOSIS — M25.552 PAIN IN RIGHT HIP: ICD-10-CM

## 2024-07-03 DIAGNOSIS — S50.01XA CONTUSION OF RIGHT ELBOW, INITIAL ENCOUNTER: ICD-10-CM

## 2024-07-03 DIAGNOSIS — G89.29 CERVICALGIA: ICD-10-CM

## 2024-07-03 DIAGNOSIS — J45.909 UNSPECIFIED ASTHMA, UNCOMPLICATED: ICD-10-CM

## 2024-07-03 DIAGNOSIS — S50.02XA CONTUSION OF LEFT ELBOW, INITIAL ENCOUNTER: ICD-10-CM

## 2024-07-03 DIAGNOSIS — Z87.891 PERSONAL HISTORY OF NICOTINE DEPENDENCE: ICD-10-CM

## 2024-07-03 DIAGNOSIS — Z86.16 PERSONAL HISTORY OF COVID-19: ICD-10-CM

## 2024-07-03 PROCEDURE — 99396 PREV VISIT EST AGE 40-64: CPT

## 2024-07-08 LAB
25(OH)D3 SERPL-MCNC: 36.9 NG/ML
ALBUMIN SERPL ELPH-MCNC: 4.4 G/DL
ALP BLD-CCNC: 64 U/L
ALT SERPL-CCNC: 30 U/L
ANION GAP SERPL CALC-SCNC: 13 MMOL/L
APPEARANCE: CLEAR
AST SERPL-CCNC: 17 U/L
BACTERIA: NEGATIVE /HPF
BILIRUB SERPL-MCNC: 0.6 MG/DL
BILIRUBIN URINE: NEGATIVE
BLOOD URINE: NEGATIVE
BUN SERPL-MCNC: 17 MG/DL
CALCIUM SERPL-MCNC: 9.1 MG/DL
CAST: 1 /LPF
CCP AB SER IA-ACNC: <8 UNITS
CHLORIDE SERPL-SCNC: 103 MMOL/L
CHOLEST SERPL-MCNC: 208 MG/DL
CO2 SERPL-SCNC: 22 MMOL/L
COLOR: YELLOW
CREAT SERPL-MCNC: 0.86 MG/DL
CRP SERPL-MCNC: <3 MG/L
EGFR: 80 ML/MIN/1.73M2
EPITHELIAL CELLS: 1 /HPF
ERYTHROCYTE [SEDIMENTATION RATE] IN BLOOD BY WESTERGREN METHOD: 12 MM/HR
ESTIMATED AVERAGE GLUCOSE: 108 MG/DL
GLUCOSE QUALITATIVE U: NEGATIVE MG/DL
GLUCOSE SERPL-MCNC: 91 MG/DL
HBA1C MFR BLD HPLC: 5.4 %
HCT VFR BLD CALC: 40.5 %
HDLC SERPL-MCNC: 61 MG/DL
HGB BLD-MCNC: 13.3 G/DL
IRON SERPL-MCNC: 108 UG/DL
KETONES URINE: NEGATIVE MG/DL
LDLC SERPL CALC-MCNC: 125 MG/DL
LEUKOCYTE ESTERASE URINE: NEGATIVE
MCHC RBC-ENTMCNC: 30 PG
MCHC RBC-ENTMCNC: 32.8 GM/DL
MCV RBC AUTO: 91.2 FL
MICROSCOPIC-UA: NORMAL
NITRITE URINE: NEGATIVE
NONHDLC SERPL-MCNC: 148 MG/DL
PH URINE: 7
PLATELET # BLD AUTO: 297 K/UL
POTASSIUM SERPL-SCNC: 4.2 MMOL/L
PROT SERPL-MCNC: 6.7 G/DL
PROTEIN URINE: NEGATIVE MG/DL
RBC # BLD: 4.44 M/UL
RBC # FLD: 12.9 %
RED BLOOD CELLS URINE: 2 /HPF
RF+CCP IGG SER-IMP: NEGATIVE
RHEUMATOID FACT SER QL: 10 IU/ML
SODIUM SERPL-SCNC: 139 MMOL/L
SPECIFIC GRAVITY URINE: 1.02
TRIGL SERPL-MCNC: 127 MG/DL
TSH SERPL-ACNC: 1.61 UIU/ML
UROBILINOGEN URINE: 0.2 MG/DL
WBC # FLD AUTO: 6.46 K/UL
WHITE BLOOD CELLS URINE: 0 /HPF

## 2024-07-09 LAB — ANA SER IF-ACNC: NEGATIVE

## 2024-07-10 ENCOUNTER — RX RENEWAL (OUTPATIENT)
Age: 54
End: 2024-07-10

## 2024-08-12 RX ORDER — PANTOPRAZOLE 40 MG/1
40 TABLET, DELAYED RELEASE ORAL DAILY
Qty: 1 | Refills: 3 | Status: ACTIVE | COMMUNITY
Start: 2024-08-12 | End: 1900-01-01

## 2024-08-26 ENCOUNTER — APPOINTMENT (OUTPATIENT)
Dept: RHEUMATOLOGY | Facility: CLINIC | Age: 54
End: 2024-08-26
Payer: COMMERCIAL

## 2024-08-26 VITALS
SYSTOLIC BLOOD PRESSURE: 122 MMHG | OXYGEN SATURATION: 99 % | TEMPERATURE: 96.9 F | HEIGHT: 62 IN | HEART RATE: 74 BPM | WEIGHT: 156 LBS | BODY MASS INDEX: 28.71 KG/M2 | DIASTOLIC BLOOD PRESSURE: 82 MMHG

## 2024-08-26 DIAGNOSIS — R23.3 SPONTANEOUS ECCHYMOSES: ICD-10-CM

## 2024-08-26 DIAGNOSIS — R68.2 DRY MOUTH, UNSPECIFIED: ICD-10-CM

## 2024-08-26 DIAGNOSIS — H04.129 DRY EYE SYNDROME OF UNSPECIFIED LACRIMAL GLAND: ICD-10-CM

## 2024-08-26 DIAGNOSIS — H04.123 DRY MOUTH, UNSPECIFIED: ICD-10-CM

## 2024-08-26 DIAGNOSIS — M79.10 MYALGIA, UNSPECIFIED SITE: ICD-10-CM

## 2024-08-26 DIAGNOSIS — G89.29 OTHER CHRONIC PAIN: ICD-10-CM

## 2024-08-26 PROCEDURE — 99205 OFFICE O/P NEW HI 60 MIN: CPT
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
## 2024-08-29 NOTE — ASSESSMENT
[FreeTextEntry1] : .   Ms. Nash is a current non-smoker with a PMHx GERD here for c/o widespread body pain   # Chronic pain likely 2/2 FMS widespread non-inflammatory pain, non-restorative sleep, brain fog, fatigue -- classification sheet  -- PT/aquatherapy -- NSAID OTC   #Sicca --  inflammatory markers negative / normal  -- SAMUEL/RF negative  -- check sjogrens  -- low suspicion for active inflammatory aiCTD  # myalgias, oral ulcers, easy bruising -- check vitamins  -- check muscle enzymes  -- TSH  More than 50% of the encounter was spent counseling the patient on differential, workup, disease course and treatment/management.  Education was provided to the patient during this encounter.  All questions and concerns were addressed and answered.   The patient verbalized understanding and agreed to the plan.   Patient has been instructed to call for an appointment if new symptoms develop. Patient has been instructed to make a follow-up appointment in 3 months.   Time spent on the encounter included, but is not limited to, preparing to see the patient, obtaining and/or reviewing separately obtained history, performing the evaluation, counseling and educating, independently interpreting results with communication to patient, order placement, referring and/or communicating with other health professionals as described, and documenting clinical information in the electronic health record

## 2024-08-29 NOTE — PHYSICAL EXAM
[TextEntry] : CONSTITUTIONAL:  Alert, NAD, well nourished, well developed with normal voice EYES:  Normal white sclera, EOMI, lacrimal glands unremarkable ENT: Normal outer ear/nose, MMM, no oral ulcers NECK: normal appearance, thyroid not enlarged PULMONARY: no respiratory distress, on RA, CTA bilaterally CARDIO:  RRR, S1, S2, no rubs VASCULAR:  no RP, LR, C, C, E LYMPHATICS: no ant/post/supraclavicular nodes BACK: no CVA tenderness, no spinal tenderness, SI NT, FMTP positive at elbows, SS, occiput, right SI, right GT and bilateral knees MSK: no synovitis, no dactylitis SKIN: no rashes, nodules or tophi.  turgor normal.   no nail pitting NEURO:  MS 5/5 proximally and distally.   No focal defects.   PSYCHE: alert and oriented x3, normal insight/judgement, normal affect. mood okay.  Recent memory intact

## 2024-09-07 ENCOUNTER — LABORATORY RESULT (OUTPATIENT)
Age: 54
End: 2024-09-07

## 2024-09-07 LAB
FERRITIN SERPL-MCNC: 178 NG/ML
TSH SERPL-ACNC: 1.48 UIU/ML
VIT B12 SERPL-MCNC: 707 PG/ML

## 2024-09-08 LAB
ENA SS-A AB SER IA-ACNC: <0.2 AL
ENA SS-B AB SER IA-ACNC: <0.2 AL
HAV IGM SER QL: NONREACTIVE
HBV CORE IGM SER QL: NONREACTIVE
HBV SURFACE AG SER QL: NONREACTIVE
HCV AB SER QL: NONREACTIVE
HCV S/CO RATIO: 0.09 S/CO

## 2024-09-25 DIAGNOSIS — E78.5 HYPERLIPIDEMIA, UNSPECIFIED: ICD-10-CM

## 2024-09-27 DIAGNOSIS — Z79.890 HORMONE REPLACEMENT THERAPY: ICD-10-CM

## 2024-10-17 ENCOUNTER — APPOINTMENT (OUTPATIENT)
Dept: GASTROENTEROLOGY | Facility: CLINIC | Age: 54
End: 2024-10-17
Payer: COMMERCIAL

## 2024-10-17 VITALS
BODY MASS INDEX: 27.79 KG/M2 | HEIGHT: 62 IN | WEIGHT: 151 LBS | SYSTOLIC BLOOD PRESSURE: 116 MMHG | DIASTOLIC BLOOD PRESSURE: 80 MMHG

## 2024-10-17 DIAGNOSIS — R10.13 EPIGASTRIC PAIN: ICD-10-CM

## 2024-10-17 DIAGNOSIS — R10.9 UNSPECIFIED ABDOMINAL PAIN: ICD-10-CM

## 2024-10-17 DIAGNOSIS — K21.9 GASTRO-ESOPHAGEAL REFLUX DISEASE W/OUT ESOPHAGITIS: ICD-10-CM

## 2024-10-17 PROCEDURE — 99213 OFFICE O/P EST LOW 20 MIN: CPT

## 2024-10-28 ENCOUNTER — OUTPATIENT (OUTPATIENT)
Dept: OUTPATIENT SERVICES | Facility: HOSPITAL | Age: 54
LOS: 1 days | End: 2024-10-28
Payer: COMMERCIAL

## 2024-10-28 ENCOUNTER — APPOINTMENT (OUTPATIENT)
Dept: ULTRASOUND IMAGING | Facility: CLINIC | Age: 54
End: 2024-10-28
Payer: COMMERCIAL

## 2024-10-28 DIAGNOSIS — K21.9 GASTRO-ESOPHAGEAL REFLUX DISEASE WITHOUT ESOPHAGITIS: ICD-10-CM

## 2024-10-28 PROCEDURE — 76700 US EXAM ABDOM COMPLETE: CPT | Mod: 26

## 2024-10-28 PROCEDURE — 76700 US EXAM ABDOM COMPLETE: CPT

## 2024-11-04 ENCOUNTER — APPOINTMENT (OUTPATIENT)
Dept: RHEUMATOLOGY | Facility: CLINIC | Age: 54
End: 2024-11-04
Payer: COMMERCIAL

## 2024-11-04 VITALS
HEART RATE: 76 BPM | SYSTOLIC BLOOD PRESSURE: 133 MMHG | WEIGHT: 153 LBS | TEMPERATURE: 97.8 F | HEIGHT: 62 IN | DIASTOLIC BLOOD PRESSURE: 88 MMHG | BODY MASS INDEX: 28.16 KG/M2 | OXYGEN SATURATION: 98 %

## 2024-11-04 DIAGNOSIS — G89.29 OTHER CHRONIC PAIN: ICD-10-CM

## 2024-11-04 DIAGNOSIS — M79.7 FIBROMYALGIA: ICD-10-CM

## 2024-11-04 DIAGNOSIS — G89.29 PAIN IN RIGHT HIP: ICD-10-CM

## 2024-11-04 DIAGNOSIS — M25.561 PAIN IN RIGHT HIP: ICD-10-CM

## 2024-11-04 DIAGNOSIS — M25.562 PAIN IN RIGHT HIP: ICD-10-CM

## 2024-11-04 DIAGNOSIS — M79.10 MYALGIA, UNSPECIFIED SITE: ICD-10-CM

## 2024-11-04 DIAGNOSIS — K12.1 OTHER FORMS OF STOMATITIS: ICD-10-CM

## 2024-11-04 DIAGNOSIS — R23.3 SPONTANEOUS ECCHYMOSES: ICD-10-CM

## 2024-11-04 DIAGNOSIS — M25.551 PAIN IN RIGHT HIP: ICD-10-CM

## 2024-11-04 DIAGNOSIS — R79.89 OTHER SPECIFIED ABNORMAL FINDINGS OF BLOOD CHEMISTRY: ICD-10-CM

## 2024-11-04 DIAGNOSIS — M25.552 PAIN IN RIGHT HIP: ICD-10-CM

## 2024-11-04 DIAGNOSIS — H04.129 DRY EYE SYNDROME OF UNSPECIFIED LACRIMAL GLAND: ICD-10-CM

## 2024-11-04 PROCEDURE — 99214 OFFICE O/P EST MOD 30 MIN: CPT

## 2024-11-04 PROCEDURE — G2211 COMPLEX E/M VISIT ADD ON: CPT | Mod: NC

## 2025-01-29 ENCOUNTER — APPOINTMENT (OUTPATIENT)
Dept: NEUROLOGY | Facility: CLINIC | Age: 55
End: 2025-01-29
Payer: COMMERCIAL

## 2025-01-29 DIAGNOSIS — G44.209 TENSION-TYPE HEADACHE, UNSPECIFIED, NOT INTRACTABLE: ICD-10-CM

## 2025-01-29 DIAGNOSIS — G43.109 MIGRAINE WITH AURA, NOT INTRACTABLE, W/OUT STATUS MIGRAINOSUS: ICD-10-CM

## 2025-01-29 DIAGNOSIS — M54.2 CERVICALGIA: ICD-10-CM

## 2025-01-29 DIAGNOSIS — G89.29 CERVICALGIA: ICD-10-CM

## 2025-01-29 PROCEDURE — 99213 OFFICE O/P EST LOW 20 MIN: CPT | Mod: 95

## 2025-02-11 ENCOUNTER — NON-APPOINTMENT (OUTPATIENT)
Age: 55
End: 2025-02-11

## 2025-02-13 ENCOUNTER — APPOINTMENT (OUTPATIENT)
Dept: RHEUMATOLOGY | Facility: CLINIC | Age: 55
End: 2025-02-13

## 2025-02-13 VITALS
TEMPERATURE: 98.2 F | DIASTOLIC BLOOD PRESSURE: 70 MMHG | BODY MASS INDEX: 27.23 KG/M2 | WEIGHT: 148 LBS | OXYGEN SATURATION: 98 % | HEIGHT: 62 IN | SYSTOLIC BLOOD PRESSURE: 110 MMHG | HEART RATE: 82 BPM

## 2025-02-13 DIAGNOSIS — M26.629 ARTHRALGIA OF TEMPOROMANDIBULAR JOINT,: ICD-10-CM

## 2025-02-13 DIAGNOSIS — M79.7 FIBROMYALGIA: ICD-10-CM

## 2025-02-13 DIAGNOSIS — U07.1 COVID-19: ICD-10-CM

## 2025-02-13 DIAGNOSIS — K12.1 OTHER FORMS OF STOMATITIS: ICD-10-CM

## 2025-02-13 DIAGNOSIS — F45.8 OTHER SOMATOFORM DISORDERS: ICD-10-CM

## 2025-02-13 PROCEDURE — 99215 OFFICE O/P EST HI 40 MIN: CPT

## 2025-02-13 RX ORDER — CYCLOBENZAPRINE HYDROCHLORIDE 5 MG/1
5 TABLET, FILM COATED ORAL
Qty: 120 | Refills: 2 | Status: ACTIVE | COMMUNITY
Start: 2025-02-13 | End: 1900-01-01

## 2025-02-19 PROBLEM — U07.1 DISEASE DUE TO SEVERE ACUTE RESPIRATORY SYNDROME CORONAVIRUS 2 (SARS-COV-2): Status: ACTIVE | Noted: 2020-09-24

## 2025-02-28 ENCOUNTER — APPOINTMENT (OUTPATIENT)
Dept: RADIOLOGY | Facility: CLINIC | Age: 55
End: 2025-02-28
Payer: COMMERCIAL

## 2025-02-28 ENCOUNTER — OUTPATIENT (OUTPATIENT)
Dept: OUTPATIENT SERVICES | Facility: HOSPITAL | Age: 55
LOS: 1 days | End: 2025-02-28
Payer: COMMERCIAL

## 2025-02-28 DIAGNOSIS — M79.671 PAIN IN RIGHT FOOT: ICD-10-CM

## 2025-02-28 PROCEDURE — 73630 X-RAY EXAM OF FOOT: CPT

## 2025-02-28 PROCEDURE — 73630 X-RAY EXAM OF FOOT: CPT | Mod: 26,50

## 2025-03-20 ENCOUNTER — APPOINTMENT (OUTPATIENT)
Dept: RHEUMATOLOGY | Facility: CLINIC | Age: 55
End: 2025-03-20

## 2025-03-20 VITALS
OXYGEN SATURATION: 98 % | SYSTOLIC BLOOD PRESSURE: 110 MMHG | HEIGHT: 62 IN | WEIGHT: 162 LBS | DIASTOLIC BLOOD PRESSURE: 60 MMHG | HEART RATE: 80 BPM | BODY MASS INDEX: 29.81 KG/M2

## 2025-03-20 DIAGNOSIS — M79.7 FIBROMYALGIA: ICD-10-CM

## 2025-03-20 DIAGNOSIS — M19.072 PRIMARY OSTEOARTHRITIS, RIGHT ANKLE AND FOOT: ICD-10-CM

## 2025-03-20 DIAGNOSIS — M67.479 GANGLION, UNSPECIFIED ANKLE AND FOOT: ICD-10-CM

## 2025-03-20 DIAGNOSIS — Q74.2 OTHER CONGENITAL MALFORMATIONS OF LOWER LIMB(S), INCLUDING PELVIC GIRDLE: ICD-10-CM

## 2025-03-20 DIAGNOSIS — M19.071 PRIMARY OSTEOARTHRITIS, RIGHT ANKLE AND FOOT: ICD-10-CM

## 2025-03-20 PROCEDURE — G2211 COMPLEX E/M VISIT ADD ON: CPT | Mod: NC

## 2025-03-20 PROCEDURE — 99215 OFFICE O/P EST HI 40 MIN: CPT

## 2025-03-21 PROBLEM — Q74.2 ACCESSORY NAVICULAR BONE OF RIGHT FOOT: Status: ACTIVE | Noted: 2025-03-21

## 2025-03-21 PROBLEM — M67.479 GANGLION CYST OF FOOT: Status: ACTIVE | Noted: 2025-03-21

## 2025-05-01 ENCOUNTER — APPOINTMENT (OUTPATIENT)
Age: 55
End: 2025-05-01

## 2025-05-05 ENCOUNTER — NON-APPOINTMENT (OUTPATIENT)
Age: 55
End: 2025-05-05

## 2025-05-05 ENCOUNTER — APPOINTMENT (OUTPATIENT)
Dept: OTOLARYNGOLOGY | Facility: CLINIC | Age: 55
End: 2025-05-05
Payer: COMMERCIAL

## 2025-05-05 VITALS — WEIGHT: 160 LBS | HEIGHT: 63 IN | BODY MASS INDEX: 28.35 KG/M2

## 2025-05-05 DIAGNOSIS — H60.543 ACUTE ECZEMATOID OTITIS EXTERNA, BILATERAL: ICD-10-CM

## 2025-05-05 DIAGNOSIS — J34.2 DEVIATED NASAL SEPTUM: ICD-10-CM

## 2025-05-05 DIAGNOSIS — H61.21 IMPACTED CERUMEN, RIGHT EAR: ICD-10-CM

## 2025-05-05 DIAGNOSIS — J34.3 HYPERTROPHY OF NASAL TURBINATES: ICD-10-CM

## 2025-05-05 DIAGNOSIS — G47.33 OBSTRUCTIVE SLEEP APNEA (ADULT) (PEDIATRIC): ICD-10-CM

## 2025-05-05 DIAGNOSIS — Z91.09 OTHER ALLERGY STATUS, OTHER THAN TO DRUGS AND BIOLOGICAL SUBSTANCES: ICD-10-CM

## 2025-05-05 PROCEDURE — 99204 OFFICE O/P NEW MOD 45 MIN: CPT | Mod: 25

## 2025-05-05 PROCEDURE — 69210 REMOVE IMPACTED EAR WAX UNI: CPT

## 2025-05-05 PROCEDURE — 31231 NASAL ENDOSCOPY DX: CPT

## 2025-05-05 RX ORDER — FLUTICASONE PROPIONATE 50 UG/1
50 SPRAY, METERED NASAL
Qty: 1 | Refills: 5 | Status: ACTIVE | COMMUNITY
Start: 2025-05-05 | End: 1900-01-01

## 2025-05-05 RX ORDER — MOMETASONE FUROATE 1 MG/G
0.1 OINTMENT TOPICAL
Qty: 1 | Refills: 2 | Status: ACTIVE | COMMUNITY
Start: 2025-05-05 | End: 1900-01-01

## 2025-05-12 ENCOUNTER — RX RENEWAL (OUTPATIENT)
Age: 55
End: 2025-05-12

## 2025-05-16 ENCOUNTER — EMERGENCY (EMERGENCY)
Facility: HOSPITAL | Age: 55
LOS: 0 days | Discharge: HOME | End: 2025-05-16
Attending: EMERGENCY MEDICINE
Payer: COMMERCIAL

## 2025-05-16 ENCOUNTER — NON-APPOINTMENT (OUTPATIENT)
Age: 55
End: 2025-05-16

## 2025-05-16 VITALS
SYSTOLIC BLOOD PRESSURE: 136 MMHG | OXYGEN SATURATION: 100 % | RESPIRATION RATE: 18 BRPM | TEMPERATURE: 98 F | DIASTOLIC BLOOD PRESSURE: 69 MMHG | HEART RATE: 77 BPM

## 2025-05-16 VITALS
DIASTOLIC BLOOD PRESSURE: 95 MMHG | TEMPERATURE: 98 F | RESPIRATION RATE: 16 BRPM | OXYGEN SATURATION: 99 % | SYSTOLIC BLOOD PRESSURE: 142 MMHG | HEART RATE: 72 BPM

## 2025-05-16 DIAGNOSIS — R55 SYNCOPE AND COLLAPSE: ICD-10-CM

## 2025-05-16 DIAGNOSIS — R42 DIZZINESS AND GIDDINESS: ICD-10-CM

## 2025-05-16 LAB
ALBUMIN SERPL ELPH-MCNC: 4.1 G/DL — SIGNIFICANT CHANGE UP (ref 3.3–5)
ALP SERPL-CCNC: 56 U/L — SIGNIFICANT CHANGE UP (ref 40–120)
ALT FLD-CCNC: 37 U/L — SIGNIFICANT CHANGE UP (ref 12–78)
ANION GAP SERPL CALC-SCNC: 5 MMOL/L — SIGNIFICANT CHANGE UP (ref 5–17)
APPEARANCE UR: CLEAR — SIGNIFICANT CHANGE UP
AST SERPL-CCNC: 24 U/L — SIGNIFICANT CHANGE UP (ref 15–37)
BASOPHILS # BLD AUTO: 0.08 K/UL — SIGNIFICANT CHANGE UP (ref 0–0.2)
BASOPHILS NFR BLD AUTO: 0.8 % — SIGNIFICANT CHANGE UP (ref 0–2)
BILIRUB SERPL-MCNC: 1 MG/DL — SIGNIFICANT CHANGE UP (ref 0.2–1.2)
BILIRUB UR-MCNC: NEGATIVE — SIGNIFICANT CHANGE UP
BUN SERPL-MCNC: 11 MG/DL — SIGNIFICANT CHANGE UP (ref 7–23)
CALCIUM SERPL-MCNC: 9.6 MG/DL — SIGNIFICANT CHANGE UP (ref 8.5–10.1)
CHLORIDE SERPL-SCNC: 103 MMOL/L — SIGNIFICANT CHANGE UP (ref 96–108)
CO2 SERPL-SCNC: 26 MMOL/L — SIGNIFICANT CHANGE UP (ref 22–31)
COLOR SPEC: YELLOW — SIGNIFICANT CHANGE UP
CREAT SERPL-MCNC: 0.92 MG/DL — SIGNIFICANT CHANGE UP (ref 0.5–1.3)
DIFF PNL FLD: NEGATIVE — SIGNIFICANT CHANGE UP
EGFR: 74 ML/MIN/1.73M2 — SIGNIFICANT CHANGE UP
EGFR: 74 ML/MIN/1.73M2 — SIGNIFICANT CHANGE UP
EOSINOPHIL # BLD AUTO: 0.12 K/UL — SIGNIFICANT CHANGE UP (ref 0–0.5)
EOSINOPHIL NFR BLD AUTO: 1.3 % — SIGNIFICANT CHANGE UP (ref 0–6)
GLUCOSE SERPL-MCNC: 92 MG/DL — SIGNIFICANT CHANGE UP (ref 70–99)
GLUCOSE UR QL: NEGATIVE MG/DL — SIGNIFICANT CHANGE UP
HCT VFR BLD CALC: 41.2 % — SIGNIFICANT CHANGE UP (ref 34.5–45)
HGB BLD-MCNC: 14 G/DL — SIGNIFICANT CHANGE UP (ref 11.5–15.5)
IMM GRANULOCYTES # BLD AUTO: 0.03 K/UL — SIGNIFICANT CHANGE UP (ref 0–0.07)
IMM GRANULOCYTES NFR BLD AUTO: 0.3 % — SIGNIFICANT CHANGE UP (ref 0–0.9)
KETONES UR QL: NEGATIVE MG/DL — SIGNIFICANT CHANGE UP
LEUKOCYTE ESTERASE UR-ACNC: NEGATIVE — SIGNIFICANT CHANGE UP
LYMPHOCYTES # BLD AUTO: 1.91 K/UL — SIGNIFICANT CHANGE UP (ref 1–3.3)
LYMPHOCYTES NFR BLD AUTO: 20.1 % — SIGNIFICANT CHANGE UP (ref 13–44)
MAGNESIUM SERPL-MCNC: 2.4 MG/DL — SIGNIFICANT CHANGE UP (ref 1.6–2.6)
MCHC RBC-ENTMCNC: 30.3 PG — SIGNIFICANT CHANGE UP (ref 27–34)
MCHC RBC-ENTMCNC: 34 G/DL — SIGNIFICANT CHANGE UP (ref 32–36)
MCV RBC AUTO: 89.2 FL — SIGNIFICANT CHANGE UP (ref 80–100)
MONOCYTES # BLD AUTO: 0.49 K/UL — SIGNIFICANT CHANGE UP (ref 0–0.9)
MONOCYTES NFR BLD AUTO: 5.2 % — SIGNIFICANT CHANGE UP (ref 2–14)
NEUTROPHILS # BLD AUTO: 6.85 K/UL — SIGNIFICANT CHANGE UP (ref 1.8–7.4)
NEUTROPHILS NFR BLD AUTO: 72.3 % — SIGNIFICANT CHANGE UP (ref 43–77)
NITRITE UR-MCNC: NEGATIVE — SIGNIFICANT CHANGE UP
NRBC # BLD AUTO: 0 K/UL — SIGNIFICANT CHANGE UP (ref 0–0)
NRBC # FLD: 0 K/UL — SIGNIFICANT CHANGE UP (ref 0–0)
NRBC BLD AUTO-RTO: 0 /100 WBCS — SIGNIFICANT CHANGE UP (ref 0–0)
PH UR: 7.5 — SIGNIFICANT CHANGE UP (ref 5–8)
PLATELET # BLD AUTO: 329 K/UL — SIGNIFICANT CHANGE UP (ref 150–400)
PMV BLD: 9 FL — SIGNIFICANT CHANGE UP (ref 7–13)
POCT URINE PREGNANCY TEST: NEGATIVE — SIGNIFICANT CHANGE UP
POTASSIUM SERPL-MCNC: 3.8 MMOL/L — SIGNIFICANT CHANGE UP (ref 3.5–5.3)
POTASSIUM SERPL-SCNC: 3.8 MMOL/L — SIGNIFICANT CHANGE UP (ref 3.5–5.3)
PROT SERPL-MCNC: 8 GM/DL — SIGNIFICANT CHANGE UP (ref 6–8.3)
PROT UR-MCNC: NEGATIVE MG/DL — SIGNIFICANT CHANGE UP
RBC # BLD: 4.62 M/UL — SIGNIFICANT CHANGE UP (ref 3.8–5.2)
RBC # FLD: 12.8 % — SIGNIFICANT CHANGE UP (ref 10.3–14.5)
SODIUM SERPL-SCNC: 134 MMOL/L — LOW (ref 135–145)
SP GR SPEC: 1 — SIGNIFICANT CHANGE UP (ref 1–1.03)
TROPONIN I, HIGH SENSITIVITY RESULT: 3.91 NG/L — SIGNIFICANT CHANGE UP
TSH SERPL-MCNC: 0.96 UU/ML — SIGNIFICANT CHANGE UP (ref 0.34–4.82)
UROBILINOGEN FLD QL: 0.2 MG/DL — SIGNIFICANT CHANGE UP (ref 0.2–1)
WBC # BLD: 9.48 K/UL — SIGNIFICANT CHANGE UP (ref 3.8–10.5)
WBC # FLD AUTO: 9.48 K/UL — SIGNIFICANT CHANGE UP (ref 3.8–10.5)

## 2025-05-16 PROCEDURE — 70450 CT HEAD/BRAIN W/O DYE: CPT

## 2025-05-16 PROCEDURE — 99285 EMERGENCY DEPT VISIT HI MDM: CPT | Mod: 25

## 2025-05-16 PROCEDURE — 84443 ASSAY THYROID STIM HORMONE: CPT

## 2025-05-16 PROCEDURE — 93005 ELECTROCARDIOGRAM TRACING: CPT

## 2025-05-16 PROCEDURE — 36000 PLACE NEEDLE IN VEIN: CPT

## 2025-05-16 PROCEDURE — 71045 X-RAY EXAM CHEST 1 VIEW: CPT | Mod: 26

## 2025-05-16 PROCEDURE — 84484 ASSAY OF TROPONIN QUANT: CPT

## 2025-05-16 PROCEDURE — 93010 ELECTROCARDIOGRAM REPORT: CPT

## 2025-05-16 PROCEDURE — 99285 EMERGENCY DEPT VISIT HI MDM: CPT

## 2025-05-16 PROCEDURE — 36415 COLL VENOUS BLD VENIPUNCTURE: CPT

## 2025-05-16 PROCEDURE — 81003 URINALYSIS AUTO W/O SCOPE: CPT

## 2025-05-16 PROCEDURE — 83735 ASSAY OF MAGNESIUM: CPT

## 2025-05-16 PROCEDURE — 80053 COMPREHEN METABOLIC PANEL: CPT

## 2025-05-16 PROCEDURE — 70450 CT HEAD/BRAIN W/O DYE: CPT | Mod: 26

## 2025-05-16 PROCEDURE — 71045 X-RAY EXAM CHEST 1 VIEW: CPT

## 2025-05-16 PROCEDURE — 85025 COMPLETE CBC W/AUTO DIFF WBC: CPT

## 2025-05-16 PROCEDURE — 81025 URINE PREGNANCY TEST: CPT

## 2025-05-16 RX ORDER — MECLIZINE HCL 12.5 MG
1 TABLET ORAL
Qty: 9 | Refills: 0
Start: 2025-05-16 | End: 2025-05-18

## 2025-05-16 RX ORDER — MECLIZINE HCL 12.5 MG
25 TABLET ORAL ONCE
Refills: 0 | Status: COMPLETED | OUTPATIENT
Start: 2025-05-16 | End: 2025-05-16

## 2025-05-16 RX ADMIN — Medication 25 MILLIGRAM(S): at 14:59

## 2025-05-16 RX ADMIN — Medication 1000 MILLILITER(S): at 14:59

## 2025-05-16 NOTE — ED STATDOCS - NSFOLLOWUPINSTRUCTIONS_ED_ALL_ED_FT
Syncope    WHAT YOU NEED TO KNOW:    What is syncope? Syncope is also called fainting or passing out. Syncope is a sudden, temporary loss of consciousness, followed by a fall from a standing or sitting position. A syncope episode lasts for 1 to 2 minutes at a time. Syncope ranges from not serious to a sign of a more serious condition that needs to be treated.    What causes syncope? Syncope is caused by a decrease in blood flow to the brain. When blood flow to the brain decreases, oxygen to the brain also decreases. Any of the following conditions may cause syncope:    A heart condition, such as a narrow artery or an irregular heartbeat    A medical condition such as severe anemia, uncontrolled diabetes, or a nerve disorder    Dehydration    Certain medicines, such as blood pressure medicines, heart medicines, or antidepressants    Problems with the blood vessels of your brain    A rapid drop in blood pressure after a body position change, such as moving from lying to sitting or standing    Straining during bowel movements, a cough or sneeze, or a stressful or fearful situation    A medical condition that affects your lungs, such as pneumonia or asthma, or hyperventilation (breathing too quickly)    A mental health condition such as panic disorder, mood disorder, or substance use disorder  What signs and symptoms may occur before syncope?    Cold, clammy, and sweaty skin    Fast breathing and a racing, pounding heartbeat    Feeling more tired than usual    Nausea, a warm feeling, and sweating    A headache, or feeling lightheaded or dizzy    Tingling sensation or numbness    Spots in front of your eyes, blurred vision, or double vision  How is the cause of syncope diagnosed? Your healthcare provider will ask about your medical history and examine you. Tell your provider about the medicines you take. You may need the following tests to find out what is causing your symptoms:    Blood tests may be done to check your electrolyte levels, such as sodium. A problem with your electrolytes can lead to syncope. Your blood sugar level may also be checked. Low blood sugar levels can cause syncope. Your provider will use a glucose meter to test a drop of blood from your finger.    Telemetry is continuous monitoring of your heart rhythm. Sticky pads placed on your skin connect to an EKG machine that records your heart rhythm.    An echocardiogram is a type of ultrasound. Sound waves are used to show the structure and function of your heart.    A stress test may show the changes that take place in your heart while it is under stress. Stress may be placed on your heart with exercise or medicine. Ask for more information about this test.    A tilt table test is used to check your heart and your blood pressure when you change positions.  Tilt Table Test      A Holter monitor is also called a portable electrocardiography (EKG) monitor. It shows your heart's electrical activity while you do your usual activities. The monitor is a small battery-operated device that you wear. It will show how fast your heart beats and if it beats in a regular pattern. Your healthcare provider may recommend this if you have syncope often over 2 to 3 days.  Holter Monitor  How is syncope treated? Treatment depends on the cause of your syncope. To prevent syncope from happening again, you may need any of the following:    Medicines may be needed to help your heart pump strongly and regularly. Your healthcare provider may also make changes to any medicines that are causing syncope.    Tilt training involves training yourself to stand for 10 to 30 minutes each day against a wall. This helps your body decrease the effects of posture changes and reduces the number of fainting spells.  What can I do to manage syncope?    Keep a record of your syncope episodes. Include your symptoms and your activity before and after the episode. The record can help your healthcare provider find the cause of your syncope and help you manage episodes.    Sit or lie down when needed. This includes when you feel dizzy, your throat is getting tight, and your vision changes. Raise your legs above the level of your heart.    Take slow, deep breaths if you start to breathe faster with anxiety or fear. This can help decrease dizziness and the feeling that you might faint.    Check your blood pressure often. This is important if you take medicine to lower your blood pressure. Check your blood pressure when you are lying down and when you are standing. Ask how often to check during the day. Keep a record of your blood pressure numbers. Your healthcare provider may use the record to help plan your treatment.  How to take a Blood Pressure  What can I do to prevent a syncope episode?    Know and avoid your triggers. Certain events may bring on syncope. These events may cause you to feel under pressure, upset, or fearful. When you feel the symptoms, you can make movements to prevent a syncope episode. For example, make a fist, cross your legs, squeeze your thighs together, or tighten your arm muscles.    Move slowly and let yourself get used to one position before you move to another position. This is very important when you change from a lying or sitting position to a standing position. Take some deep breaths before you stand up from a lying position. Stand up slowly. Sudden movements may cause a fainting spell. Sit on the side of the bed or couch for a few minutes before you stand up. If you are on bedrest, try to be upright for about 2 hours each day, or as directed. Do not lock your legs if you are standing for a long period of time. Move your legs and bend your knees to keep blood flowing.    Follow your healthcare provider's recommendations. Your provider may recommend that you drink more liquids to prevent dehydration. You may also need to have more salt to keep your blood pressure from dropping too low. Your provider will tell you how much liquid and sodium to have each day. Your provider will also tell you how much physical activity is safe for you. This will depend on what is causing your syncope.    Watch for signs of low blood sugar. These include hunger, nervousness, sweating, and fast or fluttery heartbeats. Talk with your healthcare provider about ways to keep your blood sugar level steady.    Do not strain if you are constipated. You may faint if you strain to have a bowel movement. Walking is the best way to get your bowels moving. Eat foods high in fiber to make it easier to have a bowel movement. Good examples are high-fiber cereals, beans, vegetables, and whole-grain breads. Prune juice may help make bowel movements softer.        Be careful in hot weather. Heat can cause a syncope episode. Limit activity done outside on hot days. Physical activity in hot weather can lead to dehydration. This can cause an episode.  Call your local emergency number (911 in the [place]) or have someone call if:    You suddenly have double vision, trouble speaking, numbness, and cannot move your arms or legs.    You have chest pain and trouble breathing.  When should I seek immediate care?    You are bleeding because you hit your head when you fainted.    You vomit blood or material that looks like coffee grounds.    You see blood in your bowel movement.  When should I call my doctor?    You have new or worsening symptoms.    You have another syncope episode.    You have a headache, fast heartbeat, or feel too dizzy to stand up.    You have questions or concerns about your condition or care.  CARE AGREEMENT:    You have the right to help plan your care. Learn about your health condition and how it may be treated. Discuss treatment options with your healthcare providers to decide what care you want to receive. You always have the right to refuse treatment. Syncope    WHAT YOU NEED TO KNOW:    What is syncope? Syncope is also called fainting or passing out. Syncope is a sudden, temporary loss of consciousness, followed by a fall from a standing or sitting position. A syncope episode lasts for 1 to 2 minutes at a time. Syncope ranges from not serious to a sign of a more serious condition that needs to be treated.    What causes syncope? Syncope is caused by a decrease in blood flow to the brain. When blood flow to the brain decreases, oxygen to the brain also decreases. Any of the following conditions may cause syncope:    A heart condition, such as a narrow artery or an irregular heartbeat    A medical condition such as severe anemia, uncontrolled diabetes, or a nerve disorder    Dehydration    Certain medicines, such as blood pressure medicines, heart medicines, or antidepressants    Problems with the blood vessels of your brain    A rapid drop in blood pressure after a body position change, such as moving from lying to sitting or standing    Straining during bowel movements, a cough or sneeze, or a stressful or fearful situation    A medical condition that affects your lungs, such as pneumonia or asthma, or hyperventilation (breathing too quickly)    A mental health condition such as panic disorder, mood disorder, or substance use disorder  What signs and symptoms may occur before syncope?    Cold, clammy, and sweaty skin    Fast breathing and a racing, pounding heartbeat    Feeling more tired than usual    Nausea, a warm feeling, and sweating    A headache, or feeling lightheaded or dizzy    Tingling sensation or numbness    Spots in front of your eyes, blurred vision, or double vision  How is the cause of syncope diagnosed? Your healthcare provider will ask about your medical history and examine you. Tell your provider about the medicines you take. You may need the following tests to find out what is causing your symptoms:    Blood tests may be done to check your electrolyte levels, such as sodium. A problem with your electrolytes can lead to syncope. Your blood sugar level may also be checked. Low blood sugar levels can cause syncope. Your provider will use a glucose meter to test a drop of blood from your finger.    Telemetry is continuous monitoring of your heart rhythm. Sticky pads placed on your skin connect to an EKG machine that records your heart rhythm.    An echocardiogram is a type of ultrasound. Sound waves are used to show the structure and function of your heart.    A stress test may show the changes that take place in your heart while it is under stress. Stress may be placed on your heart with exercise or medicine. Ask for more information about this test.    A tilt table test is used to check your heart and your blood pressure when you change positions.  Tilt Table Test      A Holter monitor is also called a portable electrocardiography (EKG) monitor. It shows your heart's electrical activity while you do your usual activities. The monitor is a small battery-operated device that you wear. It will show how fast your heart beats and if it beats in a regular pattern. Your healthcare provider may recommend this if you have syncope often over 2 to 3 days.  Holter Monitor  How is syncope treated? Treatment depends on the cause of your syncope. To prevent syncope from happening again, you may need any of the following:    Medicines may be needed to help your heart pump strongly and regularly. Your healthcare provider may also make changes to any medicines that are causing syncope.    Tilt training involves training yourself to stand for 10 to 30 minutes each day against a wall. This helps your body decrease the effects of posture changes and reduces the number of fainting spells.  What can I do to manage syncope?    Keep a record of your syncope episodes. Include your symptoms and your activity before and after the episode. The record can help your healthcare provider find the cause of your syncope and help you manage episodes.    Sit or lie down when needed. This includes when you feel dizzy, your throat is getting tight, and your vision changes. Raise your legs above the level of your heart.    Take slow, deep breaths if you start to breathe faster with anxiety or fear. This can help decrease dizziness and the feeling that you might faint.    Check your blood pressure often. This is important if you take medicine to lower your blood pressure. Check your blood pressure when you are lying down and when you are standing. Ask how often to check during the day. Keep a record of your blood pressure numbers. Your healthcare provider may use the record to help plan your treatment.  How to take a Blood Pressure  What can I do to prevent a syncope episode?    Know and avoid your triggers. Certain events may bring on syncope. These events may cause you to feel under pressure, upset, or fearful. When you feel the symptoms, you can make movements to prevent a syncope episode. For example, make a fist, cross your legs, squeeze your thighs together, or tighten your arm muscles.    Move slowly and let yourself get used to one position before you move to another position. This is very important when you change from a lying or sitting position to a standing position. Take some deep breaths before you stand up from a lying position. Stand up slowly. Sudden movements may cause a fainting spell. Sit on the side of the bed or couch for a few minutes before you stand up. If you are on bedrest, try to be upright for about 2 hours each day, or as directed. Do not lock your legs if you are standing for a long period of time. Move your legs and bend your knees to keep blood flowing.    Follow your healthcare provider's recommendations. Your provider may recommend that you drink more liquids to prevent dehydration. You may also need to have more salt to keep your blood pressure from dropping too low. Your provider will tell you how much liquid and sodium to have each day. Your provider will also tell you how much physical activity is safe for you. This will depend on what is causing your syncope.    Watch for signs of low blood sugar. These include hunger, nervousness, sweating, and fast or fluttery heartbeats. Talk with your healthcare provider about ways to keep your blood sugar level steady.    Do not strain if you are constipated. You may faint if you strain to have a bowel movement. Walking is the best way to get your bowels moving. Eat foods high in fiber to make it easier to have a bowel movement. Good examples are high-fiber cereals, beans, vegetables, and whole-grain breads. Prune juice may help make bowel movements softer.        Be careful in hot weather. Heat can cause a syncope episode. Limit activity done outside on hot days. Physical activity in hot weather can lead to dehydration. This can cause an episode.  Call your local emergency number (911 in the [place]) or have someone call if:    You suddenly have double vision, trouble speaking, numbness, and cannot move your arms or legs.    You have chest pain and trouble breathing.  When should I seek immediate care?    You are bleeding because you hit your head when you fainted.    You vomit blood or material that looks like coffee grounds.    You see blood in your bowel movement.  When should I call my doctor?    You have new or worsening symptoms.    You have another syncope episode.    You have a headache, fast heartbeat, or feel too dizzy to stand up.    You have questions or concerns about your condition or care.  CARE AGREEMENT:    You have the right to help plan your care. Learn about your health condition and how it may be treated. Discuss treatment options with your healthcare providers to decide what care you want to receive. You always have the right to refuse treatment.    Vertigo  The outer and inner ear showing the eardrum and ear canal.  Vertigo is the feeling that you or the things around you are moving or spinning when they're not. It's different than feeling dizzy. It can also cause:  Loss of balance.  Trouble standing or walking.  Nausea and vomiting.  This feeling can come and go at any time. It can last from a few seconds to minutes or even hours. It may go away on its own or be treated with medicine.    What are the types of vertigo?  There are two types of vertigo:  Peripheral vertigo happens when parts of your inner ear don't work like they should. This is the more common type.  Central vertigo happens when your brain and spinal cord don't work like they should.  Your health care provider will do tests to find out what kind of vertigo you have. This will help them decide on the right treatment for you.    Follow these instructions at home:  Eating and drinking    Drink enough fluid to keep your pee (urine) pale yellow.  Do not drink alcohol.  Activity    When you get up in the morning, first sit up on the side of the bed. When you feel okay, stand slowly while holding onto something.  Move slowly. Avoid sudden body or head movements.  Avoid certain positions, as told by your provider.  Use a cane if you have trouble standing or walking.  Sit down right away if you feel unsteady.  Place items in your home so they're easy for you to reach without bending or leaning over.  Return to normal activities when you're told. Ask what things are safe for you to do.  General instructions    Take your medicines only as told by your provider.  Contact a health care provider if:  Your medicines don't help or make your vertigo worse.  You get new symptoms.  You have a fever.  You have nausea or vomiting.  Your family or friends spot any changes in how you're acting.  A part of your body goes numb.  You feel tingling and prickling in a part of your body.  You get very bad headaches.  Get help right away if:  You're always dizzy or you faint.  You have a stiff neck.  You have trouble moving or speaking.  Your hands, arms, or legs feel weak.  Your hearing or eyesight changes.  These symptoms may be an emergency. Call 911 right away.  Do not wait to see if the symptoms will go away.  Do not drive yourself to the hospital.  This information is not intended to replace advice given to you by your health care provider. Make sure you discuss any questions you have with your health care provider.

## 2025-05-16 NOTE — ED STATDOCS - PHYSICAL EXAMINATION
Constitutional: NAD AAOx3  Eyes: PERRLA EOMI  Head: Normocephalic atraumatic  Mouth: MMM  Cardiac: regular rate   Resp: unlabored breathing  GI: Abd s/nt/nd  Neuro: grossly normal and intact,  cranial nerves II through XII intact, normal strength sensation coordination gait, NIH is 0,  MSK: normal peripheral pulses no lower extremity swelling.   Skin: No visible rashes Constitutional: NAD AAOx3  Eyes: PERRLA EOMI  Head: Normocephalic atraumatic  Mouth: MMM  Cardiac: regular rate   Resp: unlabored breathing clear b/l   GI: Abd s/nt/nd  Neuro: grossly normal and intact,  cranial nerves II through XII intact, normal strength sensation coordination gait, NIH is 0,  MSK: normal peripheral pulses no lower extremity swelling.   Skin: No visible rashes

## 2025-05-16 NOTE — ED ADULT NURSE NOTE - SUICIDE SCREENING QUESTION 3
Outpatient liver biopsy with sedation complete. Patient tolerated the procedure well. Afirma and biopsy collected by Dr Stanley. Tegaderm and gauze to the RUQ puncture site, CDI. C/O 3/10 discomfort with movement.     Patient remained comfortable for 2 hours post procedure monitoring. Discharge instructions reviewed with the patient and her daughter. All questions answered. Discharged to home in the care of her daughter.   Patient refused

## 2025-05-16 NOTE — ED STATDOCS - ATTENDING APP SHARED VISIT CONTRIBUTION OF CARE
I, Hudson Marks MD, personally saw the patient with ACP.  I have personally performed a face to face diagnostic evaluation on this patient.   The initial assessment was performed by myself and then the patient was handed off to the ACP. The patient was followed and re-evaluated by the ACP. All labs, imaging and procedures were evaluated and performed by the ACP and I was available for consultation if any questions in the patients care came up.   I personally made/approved the management plan and take responsibility for the patient management.

## 2025-05-16 NOTE — ED STATDOCS - CARE PROVIDER_API CALL
Mikey Zaragoza  Cardiovascular Disease  [address], [address]  [place] [address]  Phone: [phone]  Fax: [phone]  Follow Up Time:

## 2025-05-16 NOTE — ED STATDOCS - CLINICAL SUMMARY MEDICAL DECISION MAKING FREE TEXT BOX
55-year-old female history of vertigo presents to the emergency department for syncopal episode.  Patient states that she did not eat anything this morning states that she was feeling a little bit off went from sitting to standing and had a syncopal episode.  Did not hit head.  This happened 3 hours prior to arrival.  Now she has some slight headache.  No chest pain no shortness of breath no numbness weakness slurred speech no change in vision.  Had a fingerstick which showed blood sugar of 116.  Exam here is nonfocal cranial nerves II through XII intact normal strength sensation coordination gait NIH is 0 normal peripheral pulses no lower extremity swelling.  Patient with syncopal episode likely orthostasis versus vasovagal low suspicion for ACS doubt CVA will check labs EKG cardiac monitor symptom control reassess.

## 2025-05-16 NOTE — ED STATDOCS - CARE PROVIDERS DIRECT ADDRESSES
[email or web address] Prednisone Counseling:  I discussed with the patient the risks of prolonged use of prednisone including but not limited to weight gain, insomnia, osteoporosis, mood changes, diabetes, susceptibility to infection, glaucoma and high blood pressure.  In cases where prednisone use is prolonged, patients should be monitored with blood pressure checks, serum glucose levels and an eye exam.  Additionally, the patient may need to be placed on GI prophylaxis, PCP prophylaxis, and calcium and vitamin D supplementation and/or a bisphosphonate.  The patient verbalized understanding of the proper use and the possible adverse effects of prednisone.  All of the patient's questions and concerns were addressed.

## 2025-05-16 NOTE — ED STATDOCS - PROGRESS NOTE DETAILS
Labs, CT, CXR all unremarkable.  EKG unremarkable as well.  Discussed findings with patient and she will follow up with her cardiologist, Dr. Leyva.  She reports having a halter monitor last year with him without significant findings.  Return for any concerns.  Fadia Del Valle PA-C 55-year-old female history of vertigo presents to the emergency department for syncopal episode.  Patient states that she did not eat anything this morning states that she was feeling a little bit off went from sitting to standing and had a syncopal episode.  Did not hit head.  This happened 3 hours prior to arrival.  Now she has some slight headache.  No chest pain no shortness of breath no numbness weakness slurred speech no change in vision.  Had a fingerstick which showed blood sugar of 116.  Fadia Del Valle PA-C Labs, CT, CXR all unremarkable.  EKG unremarkable as well.  Discussed findings with patient and she will follow up with her cardiologist, Dr. Leyva.  She reports having a halter monitor last year with him without significant findings.  Dizziness improved significantly with Meclizine.   Return for any concerns.  Fadia Del Valle PA-C

## 2025-05-16 NOTE — ED ADULT TRIAGE NOTE - CHIEF COMPLAINT QUOTE
Pt presents to ER s/p witnessed syncope. Pt was sitting in doctors office at 0945 this morning and felt nausea and dizziness then sat in a chair and passed out briefly; about 30 seconds. Pt reports over the past couple days she feels like her heart skipped a beat. On arrival denies CP/SOB. Gait steady, neuro intact.

## 2025-05-16 NOTE — ED STATDOCS - PATIENT PORTAL LINK FT
You can access the FollowMyHealth Patient Portal offered by [facility] by registering at the following website: [email or web address]. By joining [facility]’s FollowMyHealth portal, you will also be able to view your health information using other applications (apps) compatible with our system.

## 2025-05-16 NOTE — ED STATDOCS - WR INTERPRETED BY 1
Diclofenac 75 mg twice a day for arthritis pain.  Norco 5 mg at times for severe pain.   Gabapentin 600 mg at night.'  Cyclobenzaprine prn for muscle spasm.   Diclofenac cream/gel prn apply to left knee and right thumb.  Injections done of cortisone to the right thumb cmc joint and left knee arthritis.  Return to office 3 months    
Fadia Del Valle

## 2025-05-16 NOTE — ED STATDOCS - OBJECTIVE STATEMENT
55-year-old female history of vertigo presents to the emergency department for syncopal episode.  Patient states that she did not eat anything this morning states that she was feeling a little bit off went from sitting to standing and had a syncopal episode.  Did not hit head.  This happened 3 hours prior to arrival.  Now she has some slight headache.  No chest pain no shortness of breath no numbness weakness slurred speech no change in vision.  Had a fingerstick which showed blood sugar of 116.

## 2025-05-30 ENCOUNTER — OUTPATIENT (OUTPATIENT)
Dept: OUTPATIENT SERVICES | Facility: HOSPITAL | Age: 55
LOS: 1 days | End: 2025-05-30
Payer: COMMERCIAL

## 2025-05-30 DIAGNOSIS — G47.33 OBSTRUCTIVE SLEEP APNEA (ADULT) (PEDIATRIC): ICD-10-CM

## 2025-05-30 PROCEDURE — 95800 SLP STDY UNATTENDED: CPT

## 2025-05-30 PROCEDURE — 95800 SLP STDY UNATTENDED: CPT | Mod: 26

## 2025-06-04 ENCOUNTER — NON-APPOINTMENT (OUTPATIENT)
Age: 55
End: 2025-06-04

## 2025-07-11 ENCOUNTER — APPOINTMENT (OUTPATIENT)
Dept: INTERNAL MEDICINE | Facility: CLINIC | Age: 55
End: 2025-07-11
Payer: COMMERCIAL

## 2025-07-11 VITALS
WEIGHT: 158 LBS | HEIGHT: 63 IN | BODY MASS INDEX: 28 KG/M2 | OXYGEN SATURATION: 97 % | TEMPERATURE: 98.2 F | SYSTOLIC BLOOD PRESSURE: 134 MMHG | HEART RATE: 77 BPM | DIASTOLIC BLOOD PRESSURE: 90 MMHG | RESPIRATION RATE: 15 BRPM

## 2025-07-11 PROBLEM — K12.1 ORAL ULCER: Status: RESOLVED | Noted: 2024-11-04 | Resolved: 2025-07-11

## 2025-07-11 PROBLEM — G56.21 ULNAR NEUROPATHY AT ELBOW OF RIGHT UPPER EXTREMITY: Status: RESOLVED | Noted: 2022-05-05 | Resolved: 2025-07-11

## 2025-07-11 PROBLEM — Z86.16 HISTORY OF SEVERE ACUTE RESPIRATORY SYNDROME CORONAVIRUS 2 (SARS-COV-2) DISEASE: Status: RESOLVED | Noted: 2020-09-24 | Resolved: 2025-07-11

## 2025-07-11 PROBLEM — R79.89 LOW SERUM VITAMIN C: Status: RESOLVED | Noted: 2024-11-04 | Resolved: 2025-07-11

## 2025-07-11 PROBLEM — M54.50 CHRONIC LOW BACK PAIN: Status: RESOLVED | Noted: 2022-03-15 | Resolved: 2025-07-11

## 2025-07-11 PROBLEM — Z87.39 HISTORY OF MUSCLE PAIN: Status: RESOLVED | Noted: 2024-08-26 | Resolved: 2025-07-11

## 2025-07-11 PROBLEM — R10.13 EPIGASTRIC DISCOMFORT: Status: RESOLVED | Noted: 2024-10-17 | Resolved: 2025-07-11

## 2025-07-11 PROBLEM — R51.9 SLEEP RELATED HEADACHES: Status: RESOLVED | Noted: 2019-03-05 | Resolved: 2025-07-11

## 2025-07-11 PROBLEM — Z87.898 HISTORY OF BRUISING EASILY: Status: RESOLVED | Noted: 2024-08-26 | Resolved: 2025-07-11

## 2025-07-11 PROBLEM — Z86.39 HISTORY OF HYPERLIPIDEMIA: Status: RESOLVED | Noted: 2024-09-25 | Resolved: 2025-07-11

## 2025-07-11 PROBLEM — M54.12 CERVICAL RADICULOPATHY: Status: RESOLVED | Noted: 2023-02-16 | Resolved: 2025-07-11

## 2025-07-11 PROBLEM — M47.812 CERVICAL SPONDYLOSIS: Status: RESOLVED | Noted: 2022-08-18 | Resolved: 2025-07-11

## 2025-07-11 PROBLEM — H60.543 ECZEMA OF BOTH EXTERNAL EARS: Status: RESOLVED | Noted: 2025-05-05 | Resolved: 2025-07-11

## 2025-07-11 PROBLEM — H61.21 IMPACTED CERUMEN OF RIGHT EAR: Status: RESOLVED | Noted: 2025-05-05 | Resolved: 2025-07-11

## 2025-07-11 PROBLEM — R68.2 DRY MOUTH AND EYES: Status: RESOLVED | Noted: 2024-08-26 | Resolved: 2025-07-11

## 2025-07-11 PROBLEM — Z87.898 HISTORY OF CHRONIC PAIN: Status: RESOLVED | Noted: 2024-08-26 | Resolved: 2025-07-11

## 2025-07-11 PROBLEM — M79.671 PAIN IN BOTH FEET: Status: RESOLVED | Noted: 2024-07-03 | Resolved: 2025-07-11

## 2025-07-11 PROBLEM — Z87.09 HISTORY OF EXTRINSIC ASTHMA: Status: RESOLVED | Noted: 2019-03-05 | Resolved: 2025-07-11

## 2025-07-11 PROBLEM — H04.129 DRY EYE: Status: RESOLVED | Noted: 2024-08-26 | Resolved: 2025-07-11

## 2025-07-11 PROBLEM — Z79.890: Status: RESOLVED | Noted: 2024-09-27 | Resolved: 2025-07-11

## 2025-07-11 PROCEDURE — 99396 PREV VISIT EST AGE 40-64: CPT

## 2025-08-18 ENCOUNTER — RX RENEWAL (OUTPATIENT)
Age: 55
End: 2025-08-18

## 2025-09-03 ENCOUNTER — RX RENEWAL (OUTPATIENT)
Age: 55
End: 2025-09-03

## 2025-09-10 ENCOUNTER — APPOINTMENT (OUTPATIENT)
Dept: RHEUMATOLOGY | Facility: CLINIC | Age: 55
End: 2025-09-10

## 2025-09-10 VITALS
BODY MASS INDEX: 28.53 KG/M2 | HEART RATE: 73 BPM | TEMPERATURE: 98.5 F | SYSTOLIC BLOOD PRESSURE: 130 MMHG | DIASTOLIC BLOOD PRESSURE: 84 MMHG | HEIGHT: 63 IN | WEIGHT: 161 LBS | OXYGEN SATURATION: 100 %

## 2025-09-10 DIAGNOSIS — R45.89 OTHER SYMPTOMS AND SIGNS INVOLVING EMOTIONAL STATE: ICD-10-CM

## 2025-09-10 PROCEDURE — G2211 COMPLEX E/M VISIT ADD ON: CPT | Mod: NC

## 2025-09-10 PROCEDURE — 99214 OFFICE O/P EST MOD 30 MIN: CPT

## 2025-09-10 RX ORDER — CLONAZEPAM 0.5 MG/1
0.5 TABLET ORAL
Qty: 14 | Refills: 0 | Status: ACTIVE | COMMUNITY
Start: 2025-09-10 | End: 1900-01-01

## 2025-09-19 ENCOUNTER — LABORATORY RESULT (OUTPATIENT)
Age: 55
End: 2025-09-19

## 2025-09-19 LAB
25(OH)D3 SERPL-MCNC: 49.1 NG/ML
ALBUMIN SERPL ELPH-MCNC: 4.6 G/DL
ALP BLD-CCNC: 60 U/L
ALT SERPL-CCNC: 40 U/L
AMYLASE/CREAT SERPL: 69 U/L
ANION GAP SERPL CALC-SCNC: 14 MMOL/L
AST SERPL-CCNC: 27 U/L
BASOPHILS # BLD AUTO: 0.09 K/UL
BASOPHILS NFR BLD AUTO: 1.2 %
BILIRUB SERPL-MCNC: 0.5 MG/DL
BUN SERPL-MCNC: 17 MG/DL
CALCIUM SERPL-MCNC: 9.4 MG/DL
CALCIUM SERPL-MCNC: 9.4 MG/DL
CCP AB SER IA-ACNC: <8 U/ML
CCP AB SER QL: NEGATIVE
CENTROMERE B AB SER-ACNC: <0.2 AL
CHLORIDE SERPL-SCNC: 102 MMOL/L
CK SERPL-CCNC: 93 U/L
CO2 SERPL-SCNC: 24 MMOL/L
CREAT SERPL-MCNC: 0.93 MG/DL
CRP SERPL-MCNC: 3 MG/L
DSDNA AB SER-ACNC: <1 IU/ML
EGFRCR SERPLBLD CKD-EPI 2021: 73 ML/MIN/1.73M2
ENA RNP AB SER-ACNC: 0.6 AL
ENA SCL70 AB SER-ACNC: <0.2 AL
ENA SM AB SER-ACNC: <0.2 AL
ENA SS-A AB SER-ACNC: <0.2 AL
ENA SS-B AB SER-ACNC: <0.2 AL
EOSINOPHIL # BLD AUTO: 0.31 K/UL
EOSINOPHIL NFR BLD AUTO: 4.2 %
ERYTHROCYTE [SEDIMENTATION RATE] IN BLOOD BY WESTERGREN METHOD: 20 MM/HR
FERRITIN SERPL-MCNC: 182 NG/ML
FOLATE SERPL-MCNC: 11 NG/ML
GLUCOSE SERPL-MCNC: 95 MG/DL
HAV IGM SER QL: NONREACTIVE
HBV CORE IGM SER QL: NONREACTIVE
HBV SURFACE AG SER QL: NONREACTIVE
HCT VFR BLD CALC: 43.9 %
HCV AB SER QL: NONREACTIVE
HCV S/CO RATIO: 0.08 S/CO
HGB BLD-MCNC: 14 G/DL
IMM GRANULOCYTES NFR BLD AUTO: 0.3 %
LPL SERPL-CCNC: 23 U/L
LUPUS ANTICOAGULANT CASCADE REFLEX: NORMAL
LYMPHOCYTES # BLD AUTO: 1.91 K/UL
LYMPHOCYTES NFR BLD AUTO: 25.7 %
MAN DIFF?: NORMAL
MCHC RBC-ENTMCNC: 29.7 PG
MCHC RBC-ENTMCNC: 31.9 G/DL
MCV RBC AUTO: 93.2 FL
MONOCYTES # BLD AUTO: 0.62 K/UL
MONOCYTES NFR BLD AUTO: 8.3 %
NEUTROPHILS # BLD AUTO: 4.48 K/UL
NEUTROPHILS NFR BLD AUTO: 60.3 %
PARATHYROID HORMONE INTACT: 41 PG/ML
PLATELET # BLD AUTO: 327 K/UL
POTASSIUM SERPL-SCNC: 4.7 MMOL/L
PROT SERPL-MCNC: 7.3 G/DL
PROT SERPL-MCNC: 7.3 G/DL
RBC # BLD: 4.71 M/UL
RBC # FLD: 13.4 %
RHEUMATOID FACT SERPL-ACNC: <10 IU/ML
SODIUM SERPL-SCNC: 139 MMOL/L
TSH SERPL-ACNC: 1.84 UIU/ML
VIT B12 SERPL-MCNC: 495 PG/ML
WBC # FLD AUTO: 7.43 K/UL

## 2025-09-21 LAB
C3 SERPL-MCNC: 153 MG/DL
C4 SERPL-MCNC: 24 MG/DL

## 2025-09-24 PROBLEM — E78.5 HYPERLIPIDEMIA: Status: ACTIVE | Noted: 2025-09-24
